# Patient Record
Sex: MALE | Race: WHITE | Employment: OTHER | URBAN - METROPOLITAN AREA
[De-identification: names, ages, dates, MRNs, and addresses within clinical notes are randomized per-mention and may not be internally consistent; named-entity substitution may affect disease eponyms.]

---

## 2019-06-19 ENCOUNTER — OFFICE VISIT (OUTPATIENT)
Dept: INTERNAL MEDICINE CLINIC | Age: 54
End: 2019-06-19
Payer: MEDICARE

## 2019-06-19 VITALS
WEIGHT: 205.6 LBS | BODY MASS INDEX: 30.45 KG/M2 | HEART RATE: 72 BPM | HEIGHT: 69 IN | SYSTOLIC BLOOD PRESSURE: 126 MMHG | DIASTOLIC BLOOD PRESSURE: 88 MMHG

## 2019-06-19 DIAGNOSIS — L40.9 PSORIASIS: ICD-10-CM

## 2019-06-19 DIAGNOSIS — I73.9 PVD (PERIPHERAL VASCULAR DISEASE) (HCC): ICD-10-CM

## 2019-06-19 DIAGNOSIS — F17.219 CIGARETTE NICOTINE DEPENDENCE WITH NICOTINE-INDUCED DISORDER: ICD-10-CM

## 2019-06-19 DIAGNOSIS — E11.9 TYPE 2 DIABETES MELLITUS WITHOUT COMPLICATION, WITHOUT LONG-TERM CURRENT USE OF INSULIN (HCC): Primary | ICD-10-CM

## 2019-06-19 DIAGNOSIS — E11.9 TYPE 2 DIABETES MELLITUS WITHOUT COMPLICATION, WITHOUT LONG-TERM CURRENT USE OF INSULIN (HCC): ICD-10-CM

## 2019-06-19 DIAGNOSIS — Z12.5 SCREENING FOR PROSTATE CANCER: ICD-10-CM

## 2019-06-19 DIAGNOSIS — L40.50 ARTHRITIS WITH PSORIASIS (HCC): ICD-10-CM

## 2019-06-19 LAB
A/G RATIO: 1.4 (ref 1.1–2.2)
ALBUMIN SERPL-MCNC: 3.8 G/DL (ref 3.4–5)
ALP BLD-CCNC: 115 U/L (ref 40–129)
ALT SERPL-CCNC: 19 U/L (ref 10–40)
ANION GAP SERPL CALCULATED.3IONS-SCNC: 13 MMOL/L (ref 3–16)
AST SERPL-CCNC: 26 U/L (ref 15–37)
BILIRUB SERPL-MCNC: <0.2 MG/DL (ref 0–1)
BILIRUBIN URINE: NEGATIVE
BLOOD, URINE: NEGATIVE
BUN BLDV-MCNC: 5 MG/DL (ref 7–20)
CALCIUM SERPL-MCNC: 8.9 MG/DL (ref 8.3–10.6)
CHLORIDE BLD-SCNC: 103 MMOL/L (ref 99–110)
CHOLESTEROL, FASTING: 198 MG/DL (ref 0–199)
CLARITY: CLEAR
CO2: 21 MMOL/L (ref 21–32)
COLOR: YELLOW
CREAT SERPL-MCNC: 0.7 MG/DL (ref 0.9–1.3)
CREATININE URINE: 89.7 MG/DL (ref 39–259)
GFR AFRICAN AMERICAN: >60
GFR NON-AFRICAN AMERICAN: >60
GLOBULIN: 2.7 G/DL
GLUCOSE BLD-MCNC: 135 MG/DL (ref 70–99)
GLUCOSE URINE: 100 MG/DL
HCT VFR BLD CALC: 44.5 % (ref 40.5–52.5)
HDLC SERPL-MCNC: 41 MG/DL (ref 40–60)
HEMOGLOBIN: 15.2 G/DL (ref 13.5–17.5)
KETONES, URINE: NEGATIVE MG/DL
LDL CHOLESTEROL CALCULATED: ABNORMAL MG/DL
LDL CHOLESTEROL DIRECT: 101 MG/DL
LEUKOCYTE ESTERASE, URINE: NEGATIVE
MCH RBC QN AUTO: 34.5 PG (ref 26–34)
MCHC RBC AUTO-ENTMCNC: 34.2 G/DL (ref 31–36)
MCV RBC AUTO: 101 FL (ref 80–100)
MICROALBUMIN UR-MCNC: <1.2 MG/DL
MICROALBUMIN/CREAT UR-RTO: NORMAL MG/G (ref 0–30)
MICROSCOPIC EXAMINATION: ABNORMAL
NITRITE, URINE: NEGATIVE
PDW BLD-RTO: 13.5 % (ref 12.4–15.4)
PH UA: 6 (ref 5–8)
PLATELET # BLD: 236 K/UL (ref 135–450)
PMV BLD AUTO: 8.2 FL (ref 5–10.5)
POTASSIUM SERPL-SCNC: 4.2 MMOL/L (ref 3.5–5.1)
PROSTATE SPECIFIC ANTIGEN: 2.38 NG/ML (ref 0–4)
PROTEIN UA: NEGATIVE MG/DL
RBC # BLD: 4.41 M/UL (ref 4.2–5.9)
SODIUM BLD-SCNC: 137 MMOL/L (ref 136–145)
SPECIFIC GRAVITY UA: 1.02 (ref 1–1.03)
TOTAL PROTEIN: 6.5 G/DL (ref 6.4–8.2)
TRIGLYCERIDE, FASTING: 372 MG/DL (ref 0–150)
TSH REFLEX: 1.16 UIU/ML (ref 0.27–4.2)
URINE TYPE: ABNORMAL
UROBILINOGEN, URINE: 0.2 E.U./DL
VLDLC SERPL CALC-MCNC: ABNORMAL MG/DL
WBC # BLD: 7 K/UL (ref 4–11)

## 2019-06-19 PROCEDURE — 3017F COLORECTAL CA SCREEN DOC REV: CPT | Performed by: INTERNAL MEDICINE

## 2019-06-19 PROCEDURE — 99204 OFFICE O/P NEW MOD 45 MIN: CPT | Performed by: INTERNAL MEDICINE

## 2019-06-19 PROCEDURE — G8417 CALC BMI ABV UP PARAM F/U: HCPCS | Performed by: INTERNAL MEDICINE

## 2019-06-19 PROCEDURE — G8427 DOCREV CUR MEDS BY ELIG CLIN: HCPCS | Performed by: INTERNAL MEDICINE

## 2019-06-19 PROCEDURE — 4004F PT TOBACCO SCREEN RCVD TLK: CPT | Performed by: INTERNAL MEDICINE

## 2019-06-19 PROCEDURE — 3046F HEMOGLOBIN A1C LEVEL >9.0%: CPT | Performed by: INTERNAL MEDICINE

## 2019-06-19 PROCEDURE — 2022F DILAT RTA XM EVC RTNOPTHY: CPT | Performed by: INTERNAL MEDICINE

## 2019-06-19 SDOH — HEALTH STABILITY: MENTAL HEALTH: HOW MANY STANDARD DRINKS CONTAINING ALCOHOL DO YOU HAVE ON A TYPICAL DAY?: 5 OR 6

## 2019-06-19 SDOH — HEALTH STABILITY: MENTAL HEALTH: HOW OFTEN DO YOU HAVE A DRINK CONTAINING ALCOHOL?: 4 OR MORE TIMES A WEEK

## 2019-06-19 ASSESSMENT — ENCOUNTER SYMPTOMS
WHEEZING: 0
RHINORRHEA: 0
NAUSEA: 0
BACK PAIN: 1
VOICE CHANGE: 0
EYE PAIN: 0
ABDOMINAL PAIN: 0
SHORTNESS OF BREATH: 0
EYE DISCHARGE: 0
VOMITING: 0
TROUBLE SWALLOWING: 0
COLOR CHANGE: 0
COUGH: 0

## 2019-06-19 ASSESSMENT — PATIENT HEALTH QUESTIONNAIRE - PHQ9
2. FEELING DOWN, DEPRESSED OR HOPELESS: 1
4. FEELING TIRED OR HAVING LITTLE ENERGY: 1
SUM OF ALL RESPONSES TO PHQ QUESTIONS 1-9: 3
8. MOVING OR SPEAKING SO SLOWLY THAT OTHER PEOPLE COULD HAVE NOTICED. OR THE OPPOSITE, BEING SO FIGETY OR RESTLESS THAT YOU HAVE BEEN MOVING AROUND A LOT MORE THAN USUAL: 0
SUM OF ALL RESPONSES TO PHQ QUESTIONS 1-9: 3
SUM OF ALL RESPONSES TO PHQ9 QUESTIONS 1 & 2: 1
10. IF YOU CHECKED OFF ANY PROBLEMS, HOW DIFFICULT HAVE THESE PROBLEMS MADE IT FOR YOU TO DO YOUR WORK, TAKE CARE OF THINGS AT HOME, OR GET ALONG WITH OTHER PEOPLE: 0
5. POOR APPETITE OR OVEREATING: 0
7. TROUBLE CONCENTRATING ON THINGS, SUCH AS READING THE NEWSPAPER OR WATCHING TELEVISION: 0
1. LITTLE INTEREST OR PLEASURE IN DOING THINGS: 0
9. THOUGHTS THAT YOU WOULD BE BETTER OFF DEAD, OR OF HURTING YOURSELF: 0
6. FEELING BAD ABOUT YOURSELF - OR THAT YOU ARE A FAILURE OR HAVE LET YOURSELF OR YOUR FAMILY DOWN: 1
3. TROUBLE FALLING OR STAYING ASLEEP: 0

## 2019-06-19 NOTE — PROGRESS NOTES
[place]olo [place]  1965  male  48 y.o. SUBJECTIVE:       Chief Complaint   Patient presents with   Dom Echeverria Doctor    Referral - General     rheumatology    Diabetes     Wants to take Metformin again       HPI:  This is a new patient here today to establish care. Patient reports he is disabled because he was told to have some kind of abnormal pathology that proned him to get recurrent fracture of his ribs and back . He moved from [place] to here. Currently staying in hotel. He is requesting referral to see a rheumatologist because of recurrence extensive psoriasis. In the past he used to take Humira. He does complains of multiple joint pain at times both ankles as well as lower back sometimes a small joints of both hands. He was diagnosed with diabetes about 7 years ago. His sugar went as high as 600. He was initially on insulin and later on switched to metformin. He does have some polyuria. However he denies polydipsia, significant weight loss, fatigue, tiredness. He is not motivated to quit smoking at this time.     PHQ-9 Total Score: 3 (6/19/2019 10:19 AM)      Past Medical History:   Diagnosis Date    Depression     Diabetes ([address]     Fracture, rib 2015    Fracture, vertebral, lumbar closed ([facility] 2018    Psoriatic arthritis ([facility]      Past Surgical History:   Procedure Laterality Date    JOINT REPLACEMENT Bilateral     Hips     Social History     Socioeconomic History    Marital status: Single     Spouse name: None    Number of children: None    Years of education: None    Highest education level: None   Occupational History    None   Social Needs    Financial resource strain: None    Food insecurity:     Worry: None     Inability: None    Transportation needs:     Medical: None     Non-medical: None   Tobacco Use    Smoking status: Current Every Day Smoker     Packs/day: 1.00     Years: 24.00     Pack years: 24.00     Types: Cigarettes    Smokeless tobacco: Never Used   Substance and Sexual Activity    Alcohol use: Yes     Frequency: 4 or more times a week     Drinks per session: 5 or 6     Binge frequency: Weekly    Drug use: Never    Sexual activity: Not Currently   Lifestyle    Physical activity:     Days per week: None     Minutes per session: None    Stress: None   Relationships    Social connections:     Talks on phone: None     Gets together: None     Attends Catholic service: None     Active member of club or organization: None     Attends meetings of clubs or organizations: None     Relationship status: None    Intimate partner violence:     Fear of current or ex partner: None     Emotionally abused: None     Physically abused: None     Forced sexual activity: None   Other Topics Concern    None   Social History Narrative    None     Family History   Problem Relation Age of Onset    Diabetes Mother     Alcohol Abuse Father     Stroke Father        Review of Systems   Constitutional: Negative for activity change, appetite change, chills, diaphoresis, fever and unexpected weight change. HENT: Negative for congestion, ear discharge, ear pain, rhinorrhea, trouble swallowing and voice change. Eyes: Negative for pain and discharge. Respiratory: Negative for cough, shortness of breath and wheezing. Cardiovascular: Negative for chest pain, palpitations and leg swelling. Gastrointestinal: Negative for abdominal pain, nausea and vomiting. Endocrine: Positive for polyuria. Negative for cold intolerance, heat intolerance, polydipsia and polyphagia. Genitourinary: Negative for dysuria, flank pain and hematuria. Musculoskeletal: Positive for arthralgias and back pain. Negative for joint swelling, neck pain and neck stiffness. Skin: Negative for color change and rash. Neurological: Negative for dizziness, syncope, light-headedness and headaches. Hematological: Negative for adenopathy. Does not bruise/bleed easily.    Psychiatric/Behavioral: COMPREHENSIVE METABOLIC PANEL; Future  -     Lipid, Fasting; Future  -     TSH with Reflex; Future  -     Urinalysis; Future  -     MICROALBUMIN / CREATININE URINE RATIO; Future  -     Diabetic Foot Exam  The patient is asked to make an attempt to improve diet and exercise patterns to aid in medical management of this problem. Psoriasis  -     [facility]    Screening for prostate cancer  -     Psa screening; Future    Arthritis with psoriasis ([facility]  -     [facility]    PVD (peripheral vascular disease) ([facility]  -     VL LOWER EXTREMITY ARTERIAL SEGMENTAL PRESSURES W PPG BILATERAL; Future  Patient is again advised to quit smoking.   He is not ready yet, he will let me know whenever he is ready  Cigarette nicotine dependence with nicotine-induced disorder  Not motivated to quit smoking            Orders Placed This Encounter   Procedures    VL LOWER EXTREMITY ARTERIAL SEGMENTAL PRESSURES W PPG BILATERAL     Standing Status:   Future     Standing Expiration Date:   6/19/2020    CBC     Standing Status:   Future     Number of Occurrences:   1     Standing Expiration Date:   6/19/2020    COMPREHENSIVE METABOLIC PANEL     Standing Status:   Future     Number of Occurrences:   1     Standing Expiration Date:   6/19/2020    Lipid, Fasting     Standing Status:   Future     Number of Occurrences:   1     Standing Expiration Date:   6/19/2020    TSH with Reflex     Standing Status:   Future     Number of Occurrences:   1     Standing Expiration Date:   6/19/2020    Urinalysis     Standing Status:   Future     Number of Occurrences:   1     Standing Expiration Date:   6/18/2020    MICROALBUMIN / CREATININE URINE RATIO     Standing Status:   Future     Number of Occurrences:   1     Standing Expiration Date:   6/19/2020    Psa screening     Standing Status:   Future     Number of Occurrences:   1     Standing Expiration Date: 6/19/2020   Sylvia Michele MD, Rhematology, [facility]     Referral Priority:   Routine     Referral Type:   Eval and Treat     Referral Reason:   Specialty Services Required     Referred to Provider:   Samantha Hampton MD     Requested Specialty:   Rheumatology     Number of Visits Requested:   1    Diabetic Foot Exam     No current outpatient medications on file. No current facility-administered medications for this visit. Return in about 1 week (around 6/26/2019). An After Visit Summary was printed and given to the patient. Documentation was done using voice recognition dragon software. Every effort was made to ensure accuracy; however, inadvertent  Unintentional computerized transcription errors may be present.

## 2019-06-20 DIAGNOSIS — E11.9 TYPE 2 DIABETES MELLITUS WITHOUT COMPLICATION, WITHOUT LONG-TERM CURRENT USE OF INSULIN (HCC): Primary | ICD-10-CM

## 2019-06-20 DIAGNOSIS — E78.5 DYSLIPIDEMIA: ICD-10-CM

## 2019-06-20 DIAGNOSIS — E11.9 TYPE 2 DIABETES MELLITUS WITHOUT COMPLICATION, WITHOUT LONG-TERM CURRENT USE OF INSULIN (HCC): ICD-10-CM

## 2019-06-20 LAB
ESTIMATED AVERAGE GLUCOSE: 134.1 MG/DL
HBA1C MFR BLD: 6.3 %

## 2019-06-20 RX ORDER — ATORVASTATIN CALCIUM 10 MG/1
10 TABLET, FILM COATED ORAL DAILY
Qty: 30 TABLET | Refills: 2 | Status: SHIPPED | OUTPATIENT
Start: 2019-06-20 | End: 2019-11-19 | Stop reason: SDUPTHER

## 2019-06-24 ENCOUNTER — HOSPITAL ENCOUNTER (OUTPATIENT)
Dept: VASCULAR LAB | Age: 54
Discharge: HOME | End: 2019-06-24
Payer: MEDICARE

## 2019-06-24 DIAGNOSIS — I73.9 PVD (PERIPHERAL VASCULAR DISEASE) (HCC): Primary | ICD-10-CM

## 2019-06-24 DIAGNOSIS — I73.9 PVD (PERIPHERAL VASCULAR DISEASE) (HCC): ICD-10-CM

## 2019-06-24 PROCEDURE — 93923 UPR/LXTR ART STDY 3+ LVLS: CPT

## 2019-06-27 ENCOUNTER — OFFICE VISIT (OUTPATIENT)
Dept: INTERNAL MEDICINE CLINIC | Age: 54
End: 2019-06-27
Payer: MEDICARE

## 2019-06-27 ENCOUNTER — TELEPHONE (OUTPATIENT)
Dept: FAMILY MEDICINE CLINIC | Age: 54
End: 2019-06-27

## 2019-06-27 VITALS
BODY MASS INDEX: 30.21 KG/M2 | DIASTOLIC BLOOD PRESSURE: 100 MMHG | WEIGHT: 204 LBS | SYSTOLIC BLOOD PRESSURE: 142 MMHG | HEIGHT: 69 IN | HEART RATE: 66 BPM

## 2019-06-27 DIAGNOSIS — F17.219 CIGARETTE NICOTINE DEPENDENCE WITH NICOTINE-INDUCED DISORDER: ICD-10-CM

## 2019-06-27 DIAGNOSIS — I10 ESSENTIAL HYPERTENSION: ICD-10-CM

## 2019-06-27 DIAGNOSIS — I73.9 PVD (PERIPHERAL VASCULAR DISEASE) (HCC): ICD-10-CM

## 2019-06-27 DIAGNOSIS — E11.42 DIABETIC POLYNEUROPATHY ASSOCIATED WITH TYPE 2 DIABETES MELLITUS (HCC): Primary | ICD-10-CM

## 2019-06-27 DIAGNOSIS — Z12.11 COLON CANCER SCREENING: ICD-10-CM

## 2019-06-27 PROCEDURE — 99214 OFFICE O/P EST MOD 30 MIN: CPT | Performed by: INTERNAL MEDICINE

## 2019-06-27 PROCEDURE — 4004F PT TOBACCO SCREEN RCVD TLK: CPT | Performed by: INTERNAL MEDICINE

## 2019-06-27 PROCEDURE — G8427 DOCREV CUR MEDS BY ELIG CLIN: HCPCS | Performed by: INTERNAL MEDICINE

## 2019-06-27 PROCEDURE — G8417 CALC BMI ABV UP PARAM F/U: HCPCS | Performed by: INTERNAL MEDICINE

## 2019-06-27 PROCEDURE — 2022F DILAT RTA XM EVC RTNOPTHY: CPT | Performed by: INTERNAL MEDICINE

## 2019-06-27 PROCEDURE — 3044F HG A1C LEVEL LT 7.0%: CPT | Performed by: INTERNAL MEDICINE

## 2019-06-27 PROCEDURE — 3017F COLORECTAL CA SCREEN DOC REV: CPT | Performed by: INTERNAL MEDICINE

## 2019-06-27 RX ORDER — BUPROPION HYDROCHLORIDE 150 MG/1
TABLET, EXTENDED RELEASE ORAL
Qty: 180 TABLET | Refills: 0 | Status: SHIPPED | OUTPATIENT
Start: 2019-06-27 | End: 2020-01-16

## 2019-06-27 RX ORDER — GABAPENTIN 100 MG/1
100 CAPSULE ORAL NIGHTLY
Qty: 30 CAPSULE | Refills: 2 | Status: SHIPPED | OUTPATIENT
Start: 2019-06-27 | End: 2019-07-30

## 2019-06-27 RX ORDER — ASPIRIN 81 MG/1
81 TABLET ORAL DAILY
Qty: 90 TABLET | Refills: 1 | Status: SHIPPED | OUTPATIENT
Start: 2019-06-27

## 2019-06-27 RX ORDER — LOSARTAN POTASSIUM 50 MG/1
50 TABLET ORAL DAILY
Qty: 90 TABLET | Refills: 0 | Status: SHIPPED | OUTPATIENT
Start: 2019-06-27 | End: 2019-11-19 | Stop reason: SDUPTHER

## 2019-06-27 ASSESSMENT — ENCOUNTER SYMPTOMS
EYE REDNESS: 0
SHORTNESS OF BREATH: 0
WHEEZING: 0
NAUSEA: 0
VOMITING: 0
ABDOMINAL PAIN: 0

## 2019-06-27 NOTE — PATIENT INSTRUCTIONS
Patient Education        Learning About Benefits From Quitting Smoking  How does quitting smoking make you healthier? If you're thinking about quitting smoking, you may have a few reasons to be smoke-free. Your health may be one of them. · When you quit smoking, you lower your risks for cancer, lung disease, heart attack, stroke, blood vessel disease, and blindness from macular degeneration. · When you're smoke-free, you get sick less often, and you heal faster. You are less likely to get colds, flu, bronchitis, and pneumonia. · As a nonsmoker, you may find that your mood is better and you are less stressed. When and how will you feel healthier? Quitting has real health benefits that start from day 1 of being smoke-free. And the longer you stay smoke-free, the healthier you get and the better you feel. The first hours  · After just 20 minutes, your blood pressure and heart rate go down. That means there's less stress on your heart and blood vessels. · Within 12 hours, the level of carbon monoxide in your blood drops back to normal. That makes room for more oxygen. With more oxygen in your body, you may notice that you have more energy than when you smoked. After 2 weeks  · Your lungs start to work better. · Your risk of heart attack starts to drop. After 1 month  · When your lungs are clear, you cough less and breathe deeper, so it's easier to be active. · Your sense of taste and smell return. That means you can enjoy food more than you have since you started smoking. Over the years  · After 1 year, your risk of heart disease is half what it would be if you kept smoking. · After 5 years, your risk of stroke starts to shrink. Within a few years after that, it's about the same as if you'd never smoked. · After 10 years, your risk of dying from lung cancer is cut by about half. And your risk for many other types of cancer is lower too. How would quitting help others in your life?   When you quit smoking, you improve the health of everyone who now breathes in your smoke. · Their heart, lung, and cancer risks drop, much like yours. · They are sick less. For babies and small children, living smoke-free means they're less likely to have ear infections, pneumonia, and bronchitis. · If you're a woman who is or will be pregnant someday, quitting smoking means a healthier . · Children who are close to you are less likely to become adult smokers. Where can you learn more? Go to https://Traypepiceweb.[email or web address] org and sign in to your Viver[employer] account. Enter [phone] in the Eloquii box to learn more about \"Learning About Benefits From Quitting Smoking. \"     If you do not have an account, please click on the \"Sign Up Now\" link. Current as of: 2018  Content Version: 12.0  © 2972-9046 [employer]. Care instructions adapted under license by [address] If you have questions about a medical condition or this instruction, always ask your healthcare professional. [address] any warranty or liability for your use of this information. Patient Education         COPD: Try a New Quit-Smoking Strategy (02:05)  Your health professional recommends that you watch this short online health video. Hear how one man with COPD finally quit after many years of smoking. How to watch the video    Scan the QR code   OR Visit the website    https://hwi. se/r/Jfqjilyu6l0os   Current as of: 2018  Content Version: 12.0   [employer]. Care instructions adapted under license by [address] If you have questions about a medical condition or this instruction, always ask your healthcare professional. [address] any warranty or liability for your use of this information.

## 2019-06-27 NOTE — TELEPHONE ENCOUNTER
Submitted PA for Gabapentin 100MG capsules, Key: [id number]. Medication has been APPROVED from 05/28/2019 through 06/26/2020. Please advise patient. Thank you.

## 2019-06-27 NOTE — PROGRESS NOTES
Shelby Sarahrashmi  1965  male  48 y.o. SUBJECTIVE:       Chief Complaint   Patient presents with    Diabetes     1 week f/u, not checking blood sugars. states wa on metformin 1000 mg bid in past.   aware needing eye exam--given #.     Leg Swelling     off and on bilateral leg--given referral for Dr. Dustin Cabrera Other     given FIT       HPI:  Follow-up visit. Patient started metformin on Lipitor. She has been complaining of pain since needles affecting both legs. Her recent Doppler study shows moderate arterial insufficiency to the left leg. He continues to suffer from multiple joint pain and swelling. Not up-to-date on diabetic eye exam.  He tried nicotine patches in the past but unsuccessful in quitting smoking.       Past Medical History:   Diagnosis Date    Depression     [facility] ([facility]     Fracture, rib 2015    Fracture, vertebral, lumbar closed ([facility] 2018    Psoriatic arthritis ([facility]      Past Surgical History:   Procedure Laterality Date    JOINT REPLACEMENT Bilateral     Hips     Social History     Socioeconomic History    Marital status: Single     Spouse name: None    Number of children: None    Years of education: None    Highest education level: None   Occupational History    None   Social Needs    Financial resource strain: None    Food insecurity:     Worry: None     Inability: None    Transportation needs:     Medical: None     Non-medical: None   Tobacco Use    Smoking status: Current Every Day Smoker     Packs/day: 1.00     Years: 24.00     Pack years: 24.00     Types: Cigarettes     Start date: 1/1/1984    Smokeless tobacco: Never Used   Substance and Sexual Activity    Alcohol use: Yes     Frequency: 4 or more times a week     Drinks per session: 5 or 6     Binge frequency: Weekly    Drug use: Never    Sexual activity: Not Currently   Lifestyle    Physical activity:     Days per week: None     Minutes per session: None    Stress: None   Relationships    Social connections:     Talks on phone: None     Gets together: None     Attends Congregational service: None     Active member of club or organization: None     Attends meetings of clubs or organizations: None     Relationship status: None    Intimate partner violence:     Fear of current or ex partner: None     Emotionally abused: None     Physically abused: None     Forced sexual activity: None   Other Topics Concern    None   Social History Narrative    None     Family History   Problem Relation Age of Onset    Diabetes Mother     Alcohol Abuse Father     Stroke Father        Review of Systems   Constitutional: Negative for diaphoresis and unexpected weight change. Eyes: Negative for redness and visual disturbance. Respiratory: Negative for shortness of breath and wheezing. Cardiovascular: Negative for chest pain and palpitations. Gastrointestinal: Negative for abdominal pain, nausea and vomiting. Endocrine: Negative for polydipsia and polyuria. Genitourinary: Negative for difficulty urinating, enuresis, flank pain and frequency. Musculoskeletal: Positive for arthralgias and joint swelling. Neurological: Negative for dizziness and light-headedness. Psychiatric/Behavioral: Negative for decreased concentration and dysphoric mood. OBJECTIVE:  Pulse Readings from Last 4 Encounters:   06/27/19 66   06/19/19 72     Wt Readings from Last 4 Encounters:   06/27/19 204 lb (92.5 kg)   06/19/19 205 lb 9.6 oz (93.3 kg)     BP Readings from Last 4 Encounters:   06/27/19 (!) 142/100   06/19/19 126/88     Physical Exam   Constitutional: He is oriented to person, place, and time. He appears well-developed. No distress. Neck: Normal carotid pulses present. Carotid bruit is not present. No thyromegaly present. Cardiovascular: Normal rate, regular rhythm and normal heart sounds. No murmur heard.   Pulmonary/Chest: Effort normal and breath sounds normal.   Musculoskeletal:   Mild  leg swelling affecting both ([address]  -     losartan (COZAAR) 50 MG tablet; Take 1 tablet by mouth daily  -     gabapentin (NEURONTIN) 100 MG capsule; Take 1 capsule by mouth nightly for 90 days. Essential hypertension  -     losartan (COZAAR) 50 MG tablet; Take 1 tablet by mouth daily    Cigarette nicotine dependence with nicotine-induced disorder  -     buPROPion (WELLBUTRIN SR) 150 MG extended release tablet; 1 tab in am for a week then 1 tab bid    Type 2 diabetes mellitus with diabetic peripheral angiopathy without gangrene [facility])  -     Ambulatory referral to Ophthalmology  The patient is asked to make an attempt to improve diet and exercise patterns to aid in medical management of this problem. Colon cancer screening  -     POCT Fecal Immunochemical Test (FIT); Future    PVD (peripheral vascular disease) (HCC)  -     aspirin EC 81 MG EC tablet; Take 1 tablet by mouth daily  Counseling about quitting smoking. He is going to follow-up with Dr. Ceferino Mata. Orders Placed This Encounter   Procedures    Ambulatory referral to Ophthalmology     Referral Priority:   Routine     Referral Type:   Consult for Advice and Opinion     Referral Reason:   Specialty Services Required     Referred to Provider:   Flaquito Sepulveda MD     Requested Specialty:   Ophthalmology     Number of Visits Requested:   1    POCT Fecal Immunochemical Test (FIT)     Standing Status:   Future     Standing Expiration Date:   6/27/2020     Current Outpatient Medications   Medication Sig Dispense Refill    buPROPion (WELLBUTRIN SR) 150 MG extended release tablet 1 tab in am for a week then 1 tab bid 180 tablet 0    aspirin EC 81 MG EC tablet Take 1 tablet by mouth daily 90 tablet 1    losartan (COZAAR) 50 MG tablet Take 1 tablet by mouth daily 90 tablet 0    gabapentin (NEURONTIN) 100 MG capsule Take 1 capsule by mouth nightly for 90 days.  30 capsule 2    metFORMIN (GLUCOPHAGE) 500 MG tablet Take 1 tablet by mouth daily (with breakfast) 30 tablet 5    atorvastatin (LIPITOR) 10 MG tablet Take 1 tablet by mouth daily 30 tablet 2     No current facility-administered medications for this visit. Return in about 3 months (around 9/27/2019). An After Visit Summary was printed and given to the patient. Documentation was done using voice recognition dragon software. Every effort was made to ensure accuracy; however, inadvertent  Unintentional computerized transcription errors may be present.

## 2019-07-15 ENCOUNTER — OFFICE VISIT (OUTPATIENT)
Dept: RHEUMATOLOGY | Age: 54
End: 2019-07-15
Payer: MEDICARE

## 2019-07-15 VITALS
HEIGHT: 69 IN | SYSTOLIC BLOOD PRESSURE: 184 MMHG | HEART RATE: 78 BPM | WEIGHT: 209 LBS | BODY MASS INDEX: 30.96 KG/M2 | DIASTOLIC BLOOD PRESSURE: 101 MMHG

## 2019-07-15 DIAGNOSIS — L40.9 PSORIASIS: ICD-10-CM

## 2019-07-15 DIAGNOSIS — L40.50 PSORIATIC ARTHRITIS (HCC): Primary | ICD-10-CM

## 2019-07-15 DIAGNOSIS — Z11.59 ENCOUNTER FOR SCREENING FOR OTHER VIRAL DISEASES: ICD-10-CM

## 2019-07-15 DIAGNOSIS — Z79.899 HIGH RISK MEDICATION USE: ICD-10-CM

## 2019-07-15 DIAGNOSIS — L40.50 PSORIATIC ARTHRITIS (HCC): ICD-10-CM

## 2019-07-15 LAB
C-REACTIVE PROTEIN: 4.3 MG/L (ref 0–5.1)
RHEUMATOID FACTOR: <10 IU/ML
SEDIMENTATION RATE, ERYTHROCYTE: 11 MM/HR (ref 0–20)

## 2019-07-15 PROCEDURE — G8417 CALC BMI ABV UP PARAM F/U: HCPCS | Performed by: INTERNAL MEDICINE

## 2019-07-15 PROCEDURE — 3017F COLORECTAL CA SCREEN DOC REV: CPT | Performed by: INTERNAL MEDICINE

## 2019-07-15 PROCEDURE — G8427 DOCREV CUR MEDS BY ELIG CLIN: HCPCS | Performed by: INTERNAL MEDICINE

## 2019-07-15 PROCEDURE — 99205 OFFICE O/P NEW HI 60 MIN: CPT | Performed by: INTERNAL MEDICINE

## 2019-07-15 PROCEDURE — 4004F PT TOBACCO SCREEN RCVD TLK: CPT | Performed by: INTERNAL MEDICINE

## 2019-07-15 RX ORDER — PREDNISONE 1 MG/1
TABLET ORAL
Qty: 40 TABLET | Refills: 0 | Status: SHIPPED | OUTPATIENT
Start: 2019-07-15 | End: 2019-08-07 | Stop reason: ALTCHOICE

## 2019-07-15 NOTE — PROGRESS NOTES
psoriasis. He also has nail pitting and onycholysis. He denies dactylitis, enthesitis, uveitis. He has a chronic low back pain which improves with exercise without any significant improvement with rest.  He also has stiffness in the back. He takes ibuprofen and Tylenol as needed with some benefit. HPI  Review of Systems    REVIEW OF SYSTEMS: Positive for rash, fatigue, ringing in ears, excessive worries, anxiety, difficulty falling asleep  Constitutional: No unanticipated weight loss or fevers. Integumentary: No photosensitivity, malar rash, livedo reticularis, alopecia and Raynaud's symptoms, sclerodactyly, skin tightening  Eyes: negative for visual disturbance and persistent redness, discharge from eyes   ENT: - No loss of hearing, vertigo, or recurrent ear infections.  - No history of nasal/oral ulcers. - No history of dry eyes/dry mouth  Cardiovascular: No history of pericarditis, chest pain or murmur or palpitations  Respiratory: No shortness of breath, cough or history of interstitial lung disease. No history of pleurisy. No history of tuberculosis or atypical infections. Gastrointestinal: No history of heart burn, dysphagia or esophageal dysmotility. No change in bowel habits or any inflammatory bowel disease. Genitourinary: No history renal disease, miscarriages. Hematologic/Lymphatic: No abnormal bruising or bleeding, blood clots or swollen lymph nodes. Neurological: No history of headaches, seizure or focal weakness. No history of neuropathies, paresthesias or hyperesthesias, facial droop, diplopia  Psychiatric: No history of bipolar disease, depression  Endocrine: Denies any polyuria, polydipsia and osteoporosis  Allergic/Immunologic: No nasal congestion or hives. I have reviewed patients Past medical History, Social History and Family History as mentioned in her chart and this remains unchanged fromprevious.     Past Medical History:   Diagnosis Date    Depression     Diabetes ([facility] BP: (!) 184/101   Site: Left Lower Arm   Pulse: 78   Weight: 209 lb (94.8 kg)   Height: 5' 9\" (1.753 m)     Physical Exam  Constitutional:  Well developed, well nourished, no acute distress, non-toxic appearance   Musculoskeletal:    RIGHT  Swell  Tender  ROM  LEFT  Swell  Tender  ROM    DIP2  0  0   [place]  0  0  FULL    DIP3  0  0  FULL   0  0   [place]   DIP4  0  0  FULL   0  0  FULL    DIP5  0  0  FULL   0  0   [place]   PIP1  0  0  FULL   0  0  FULL    PIP2  0  0  FULL   0  0  FULL    PIP3  0  0  FULL   0  0  FULL    PIP4  0  0  FULL   0  0  FULL    PIP5  0  0  FULL   0  0  FULL    MCP1  0  0  FULL   0  0  FULL    MCP2  0  0  FULL   0  0  FULL    MCP3  + + FULL   + + FULL    MCP4  0  0  FULL   0  0  FULL    MCP5  0  0  FULL   0  0  FULL    Wrist  + + FULL   + + FULL    Elbow  0  0  FULL   0  0  FULL    Shouldr  0  0  FULL   0  0  FULL    Hip  0  0  FULL   0  0  FULL    Knee  0  0  FULL   0  0  FULL    Ankle  + +  bony change  ++ ++  bony change   MTP1  0  0  FULL   0  0  FULL    MTP2  0  0  FULL   0  0  FULL    MTP3  0  0  FULL   0  0  FULL    MTP4  0  0  FULL   0  0  FULL    MTP5  0  0  FULL   0  0  FULL    IP1  0  0  FULL   0  0  FULL    IP2  0  0  FULL   0  0  FULL    IP3  0  0  FULL   0  0  FULL    IP4  0  0  FULL   0  0  FULL    IP5  0  0  FULL   0 0 FULL     Onycholysis and nail pitting+  Ambulates without assistance, normal gait  Neck: Full ROM, no tenderness,supple   Back- b/l si joint tenderness  Eyes:  PERRL, extra ocular movements intact, conjunctiva normal   HEENT:  Atraumatic, normocephalic, external ears normal, oropharynx moist, no pharyngeal exudates.    Respiratory:  No respiratory distress  GI:  Soft, nondistended, normal bowel sounds, nontender, noorganomegaly, no mass, no rebound, no guarding   :  No costovertebral angle tenderness   Integument:  Well hydrated, telangiectasias, plaque psoriasis on extremities, trunk, scalp  Lymphatic:  No lymphadenopathy noted   Neurologic:   Alert & oriented x 3, CN 2-12 normal, no focal deficits noted. Sensations Intact. Muscle strength 5/5 proximallyand distally in upper and lower extremities. Psychiatric:  Speech and behavior appropriate           LABS AND IMAGING  Outside data reviewed and in HPI    Lab Results   Component Value Date    WBC 7.0 06/19/2019    RBC 4.41 06/19/2019    HGB 15.2 06/19/2019    HCT 44.5 06/19/2019     06/19/2019    .0 06/19/2019    MCH 34.5 06/19/2019    MCHC 34.2 06/19/2019    RDW 13.5 06/19/2019       Chemistry        Component Value Date/Time     06/19/2019 1052    K 4.2 06/19/2019 1052     06/19/2019 1052    CO2 21 06/19/2019 1052    BUN 5 (L) 06/19/2019 1052    CREATININE 0.7 (L) 06/19/2019 1052        Component Value Date/Time    CALCIUM 8.9 06/19/2019 1052    ALKPHOS 115 06/19/2019 1052    AST 26 06/19/2019 1052    ALT 19 06/19/2019 1052    BILITOT <0.2 06/19/2019 1052          No results found for: SEDRATE  No results found for: CRP  No results found for: OMA, WILFRID, SSA, SSB, C3, C4  No results found for: RF, CCPABIGG  No results found for: OMA, ANATITER, ANAINT, PATH  No results found for: DSDNAG, DSDNAIGGIFA  No results found for: SSAROAB, SSALAAB  No results found for: SMAB, RNPAB  No results found for: CENTABIGG  No results found for: C3, C4, ACE  No results found for: JO1, VITD25, [id number]  No results found for: Teri Grim  No results found for: [id number]  No results found for: TSHFT4, TSH  No results found for: VITD25  ASSESSMENT AND PLAN      Assessment/Plan:      ASSESSMENT:    1. Psoriatic arthritis ([address]    2. Psoriasis    3. High risk medication use    4. Encounter for screening for other viral diseases         PLAN:     Nidhi Jim was seen today for new patient.     Diagnoses and all orders for this visit:    Psoriatic arthritis (HCC)-joint symptoms most likely secondary to psoriatic arthritis  -Peripheral arthritis, nail pitting/onycholysis  -Active synovitis on joint exam  -I will

## 2019-07-16 LAB
ANTI-NUCLEAR ANTIBODY (ANA): NEGATIVE
CYCLIC CITRULLINATED PEPTIDE ANTIBODY IGG: <0.5 U/ML (ref 0–2.9)
HEPATITIS B CORE IGM ANTIBODY: NORMAL
HEPATITIS B SURFACE ANTIGEN INTERPRETATION: NORMAL
HEPATITIS C ANTIBODY INTERPRETATION: REACTIVE

## 2019-07-18 ENCOUNTER — TELEPHONE (OUTPATIENT)
Dept: RHEUMATOLOGY | Age: 54
End: 2019-07-18

## 2019-07-18 DIAGNOSIS — R76.8 HEPATITIS C ANTIBODY POSITIVE IN BLOOD: Primary | ICD-10-CM

## 2019-07-18 LAB
QUANTI TB GOLD PLUS: NEGATIVE
QUANTI TB1 MINUS NIL: 0.01 IU/ML (ref 0–0.34)
QUANTI TB2 MINUS NIL: 0.01 IU/ML (ref 0–0.34)
QUANTIFERON MITOGEN: 8.19 IU/ML
QUANTIFERON NIL: 0.02 IU/ML

## 2019-07-24 ENCOUNTER — TELEPHONE (OUTPATIENT)
Dept: RHEUMATOLOGY | Age: 54
End: 2019-07-24

## 2019-07-25 DIAGNOSIS — L40.50 PSORIATIC ARTHRITIS (HCC): ICD-10-CM

## 2019-07-30 ENCOUNTER — OFFICE VISIT (OUTPATIENT)
Dept: INTERNAL MEDICINE CLINIC | Age: 54
End: 2019-07-30
Payer: MEDICARE

## 2019-07-30 VITALS
HEIGHT: 69 IN | BODY MASS INDEX: 30.81 KG/M2 | HEART RATE: 66 BPM | SYSTOLIC BLOOD PRESSURE: 148 MMHG | WEIGHT: 208 LBS | DIASTOLIC BLOOD PRESSURE: 96 MMHG

## 2019-07-30 DIAGNOSIS — L03.115 CELLULITIS OF RIGHT LOWER EXTREMITY: ICD-10-CM

## 2019-07-30 DIAGNOSIS — R76.8 HEPATITIS C ANTIBODY POSITIVE IN BLOOD: ICD-10-CM

## 2019-07-30 DIAGNOSIS — L08.9 WOUND INFECTION: Primary | ICD-10-CM

## 2019-07-30 DIAGNOSIS — T14.8XXA WOUND INFECTION: ICD-10-CM

## 2019-07-30 DIAGNOSIS — Z23 NEED FOR TD VACCINE: ICD-10-CM

## 2019-07-30 DIAGNOSIS — E11.42 DIABETIC POLYNEUROPATHY ASSOCIATED WITH TYPE 2 DIABETES MELLITUS (HCC): ICD-10-CM

## 2019-07-30 DIAGNOSIS — L08.9 WOUND INFECTION: ICD-10-CM

## 2019-07-30 DIAGNOSIS — Z23 NEED FOR PROPHYLACTIC VACCINATION AGAINST DIPHTHERIA-TETANUS-PERTUSSIS (DTP): ICD-10-CM

## 2019-07-30 DIAGNOSIS — T14.8XXA WOUND INFECTION: Primary | ICD-10-CM

## 2019-07-30 LAB
ANION GAP SERPL CALCULATED.3IONS-SCNC: 14 MMOL/L (ref 3–16)
BUN BLDV-MCNC: 9 MG/DL (ref 7–20)
CALCIUM SERPL-MCNC: 9.5 MG/DL (ref 8.3–10.6)
CHLORIDE BLD-SCNC: 100 MMOL/L (ref 99–110)
CO2: 23 MMOL/L (ref 21–32)
CREAT SERPL-MCNC: 0.7 MG/DL (ref 0.9–1.3)
GFR AFRICAN AMERICAN: >60
GFR NON-AFRICAN AMERICAN: >60
GLUCOSE BLD-MCNC: 169 MG/DL (ref 70–99)
HCT VFR BLD CALC: 44.8 % (ref 40.5–52.5)
HEMOGLOBIN: 15.4 G/DL (ref 13.5–17.5)
MCH RBC QN AUTO: 34.7 PG (ref 26–34)
MCHC RBC AUTO-ENTMCNC: 34.3 G/DL (ref 31–36)
MCV RBC AUTO: 101.1 FL (ref 80–100)
PDW BLD-RTO: 12.7 % (ref 12.4–15.4)
PLATELET # BLD: 252 K/UL (ref 135–450)
PMV BLD AUTO: 8.2 FL (ref 5–10.5)
POTASSIUM SERPL-SCNC: 4.3 MMOL/L (ref 3.5–5.1)
RBC # BLD: 4.43 M/UL (ref 4.2–5.9)
SODIUM BLD-SCNC: 137 MMOL/L (ref 136–145)
WBC # BLD: 8.6 K/UL (ref 4–11)

## 2019-07-30 PROCEDURE — 3044F HG A1C LEVEL LT 7.0%: CPT | Performed by: INTERNAL MEDICINE

## 2019-07-30 PROCEDURE — 90471 IMMUNIZATION ADMIN: CPT | Performed by: INTERNAL MEDICINE

## 2019-07-30 PROCEDURE — 3017F COLORECTAL CA SCREEN DOC REV: CPT | Performed by: INTERNAL MEDICINE

## 2019-07-30 PROCEDURE — G8417 CALC BMI ABV UP PARAM F/U: HCPCS | Performed by: INTERNAL MEDICINE

## 2019-07-30 PROCEDURE — G8427 DOCREV CUR MEDS BY ELIG CLIN: HCPCS | Performed by: INTERNAL MEDICINE

## 2019-07-30 PROCEDURE — 99214 OFFICE O/P EST MOD 30 MIN: CPT | Performed by: INTERNAL MEDICINE

## 2019-07-30 PROCEDURE — 4004F PT TOBACCO SCREEN RCVD TLK: CPT | Performed by: INTERNAL MEDICINE

## 2019-07-30 PROCEDURE — 2022F DILAT RTA XM EVC RTNOPTHY: CPT | Performed by: INTERNAL MEDICINE

## 2019-07-30 PROCEDURE — 90715 TDAP VACCINE 7 YRS/> IM: CPT | Performed by: INTERNAL MEDICINE

## 2019-07-30 RX ORDER — GABAPENTIN 300 MG/1
300 CAPSULE ORAL NIGHTLY
Qty: 30 CAPSULE | Refills: 2 | Status: SHIPPED | OUTPATIENT
Start: 2019-07-30 | End: 2019-11-19 | Stop reason: SDUPTHER

## 2019-07-30 RX ORDER — SULFAMETHOXAZOLE AND TRIMETHOPRIM 800; 160 MG/1; MG/1
1 TABLET ORAL 2 TIMES DAILY
Qty: 20 TABLET | Refills: 0 | Status: SHIPPED | OUTPATIENT
Start: 2019-07-30 | End: 2019-08-09

## 2019-07-30 ASSESSMENT — ENCOUNTER SYMPTOMS
PHOTOPHOBIA: 0
ABDOMINAL PAIN: 0
DIARRHEA: 0
WHEEZING: 0
VOMITING: 0
SHORTNESS OF BREATH: 0

## 2019-07-30 NOTE — PROGRESS NOTES
week: None     Minutes per session: None    Stress: None   Relationships    Social connections:     Talks on phone: None     Gets together: None     Attends Scientologist service: None     Active member of club or organization: None     Attends meetings of clubs or organizations: None     Relationship status: None    Intimate partner violence:     Fear of current or ex partner: None     Emotionally abused: None     Physically abused: None     Forced sexual activity: None   Other Topics Concern    None   Social History Narrative    None     Family History   Problem Relation Age of Onset    Diabetes Mother     Alcohol Abuse Father     Stroke Father        Review of Systems   Constitutional: Negative for fatigue and unexpected weight change. Eyes: Negative for photophobia. Respiratory: Negative for shortness of breath and wheezing. Cardiovascular: Negative for chest pain and palpitations. Gastrointestinal: Negative for abdominal pain, diarrhea and vomiting. Genitourinary: Negative for difficulty urinating and frequency. Neurological: Negative for dizziness and light-headedness. OBJECTIVE:  Pulse Readings from Last 4 Encounters:   07/30/19 66   07/15/19 78   06/27/19 66   06/19/19 72     Wt Readings from Last 4 Encounters:   07/30/19 208 lb (94.3 kg)   07/15/19 209 lb (94.8 kg)   06/27/19 204 lb (92.5 kg)   06/19/19 205 lb 9.6 oz (93.3 kg)     BP Readings from Last 4 Encounters:   07/30/19 (!) 148/96   07/15/19 (!) 184/101   06/27/19 (!) 142/100   06/19/19 126/88     Physical Exam   Constitutional: He appears well-developed and well-nourished. No distress. Eyes: Conjunctivae are normal. No scleral icterus. Cardiovascular: Normal rate, regular rhythm and normal heart sounds. Pulmonary/Chest: Effort normal and breath sounds normal. No respiratory distress. Abdominal: Soft. Bowel sounds are normal.   Musculoskeletal: He exhibits no edema. Skin: There is erythema.         Nursing note and daily 90 tablet 1    losartan (COZAAR) 50 MG tablet Take 1 tablet by mouth daily 90 tablet 0    metFORMIN (GLUCOPHAGE) 500 MG tablet Take 1 tablet by mouth daily (with breakfast) 30 tablet 5    atorvastatin (LIPITOR) 10 MG tablet Take 1 tablet by mouth daily 30 tablet 2    Adalimumab (HUMIRA PEN) 40 MG/0.4ML PNKT Inject 40 mg into the skin every 14 days (Patient not taking: Reported on 7/30/2019) 2 each 5    predniSONE (DELTASONE) 5 MG tablet Take 4 tabs daily for 4 days, 3 tabs daily for 4 days, 2 tabs daily for 4 days, 1 tab daily for 4 days and stop (Patient not taking: Reported on 7/30/2019) 40 tablet 0    buPROPion (WELLBUTRIN SR) 150 MG extended release tablet 1 tab in am for a week then 1 tab bid (Patient not taking: Reported on 7/30/2019) 180 tablet 0     No current facility-administered medications for this visit. Return in about 10 days (around 8/9/2019) for HTN, wound. If worsening symptoms patient should come back for sooner appointment. An After Visit Summary was printed and given to the patient. Documentation was done using voice recognition dragon software. Every effort was made to ensure accuracy; however, inadvertent  Unintentional computerized transcription errors may be present.

## 2019-07-31 DIAGNOSIS — E11.42 TYPE 2 DIABETES MELLITUS WITH DIABETIC POLYNEUROPATHY, WITHOUT LONG-TERM CURRENT USE OF INSULIN (HCC): Primary | ICD-10-CM

## 2019-07-31 RX ORDER — LANCETS 30 GAUGE
1 EACH MISCELLANEOUS DAILY
Qty: 100 EACH | Refills: 3 | Status: SHIPPED | OUTPATIENT
Start: 2019-07-31

## 2019-07-31 RX ORDER — GLUCOSAMINE HCL/CHONDROITIN SU 500-400 MG
CAPSULE ORAL
Qty: 100 STRIP | Refills: 3 | Status: SHIPPED | OUTPATIENT
Start: 2019-07-31

## 2019-08-01 LAB
HCV QNT BY NAAT IU/ML: NOT DETECTED IU/ML
HCV QNT BY NAAT LOG IU/ML: NOT DETECTED LOG IU/ML
INTERPRETATION: NOT DETECTED

## 2019-08-02 LAB
GRAM STAIN RESULT: ABNORMAL
ORGANISM: ABNORMAL
ORGANISM: ABNORMAL
WOUND/ABSCESS: ABNORMAL

## 2019-08-07 ENCOUNTER — OFFICE VISIT (OUTPATIENT)
Dept: INTERNAL MEDICINE CLINIC | Age: 54
End: 2019-08-07
Payer: MEDICARE

## 2019-08-07 VITALS
BODY MASS INDEX: 29.62 KG/M2 | HEIGHT: 69 IN | SYSTOLIC BLOOD PRESSURE: 120 MMHG | WEIGHT: 200 LBS | DIASTOLIC BLOOD PRESSURE: 70 MMHG | HEART RATE: 84 BPM

## 2019-08-07 DIAGNOSIS — L08.9 WOUND INFECTION: ICD-10-CM

## 2019-08-07 DIAGNOSIS — L03.115 CELLULITIS OF RIGHT LOWER EXTREMITY: Primary | ICD-10-CM

## 2019-08-07 DIAGNOSIS — T14.8XXA WOUND INFECTION: ICD-10-CM

## 2019-08-07 PROCEDURE — G8417 CALC BMI ABV UP PARAM F/U: HCPCS | Performed by: INTERNAL MEDICINE

## 2019-08-07 PROCEDURE — 4004F PT TOBACCO SCREEN RCVD TLK: CPT | Performed by: INTERNAL MEDICINE

## 2019-08-07 PROCEDURE — G8427 DOCREV CUR MEDS BY ELIG CLIN: HCPCS | Performed by: INTERNAL MEDICINE

## 2019-08-07 PROCEDURE — 3017F COLORECTAL CA SCREEN DOC REV: CPT | Performed by: INTERNAL MEDICINE

## 2019-08-07 PROCEDURE — 99212 OFFICE O/P EST SF 10 MIN: CPT | Performed by: INTERNAL MEDICINE

## 2019-08-07 RX ORDER — GREEN TEA/HOODIA GORDONII 315-12.5MG
1 CAPSULE ORAL 2 TIMES DAILY
Qty: 60 TABLET | Refills: 0 | Status: SHIPPED | OUTPATIENT
Start: 2019-08-07 | End: 2019-09-06

## 2019-08-07 RX ORDER — SULFAMETHOXAZOLE AND TRIMETHOPRIM 800; 160 MG/1; MG/1
1 TABLET ORAL 2 TIMES DAILY
Qty: 8 TABLET | Refills: 0 | Status: SHIPPED | OUTPATIENT
Start: 2019-08-07 | End: 2019-08-11

## 2019-08-07 ASSESSMENT — ENCOUNTER SYMPTOMS
WHEEZING: 0
SHORTNESS OF BREATH: 0

## 2019-11-04 ENCOUNTER — TELEPHONE (OUTPATIENT)
Dept: INTERNAL MEDICINE CLINIC | Age: 54
End: 2019-11-04
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
## 2019-11-08 ENCOUNTER — TELEPHONE (OUTPATIENT)
Dept: PRIMARY CARE CLINIC | Age: 54
End: 2019-11-08

## 2019-11-08 ENCOUNTER — OFFICE VISIT (OUTPATIENT)
Dept: INTERNAL MEDICINE CLINIC | Age: 54
End: 2019-11-08
Payer: MEDICARE

## 2019-11-08 VITALS
BODY MASS INDEX: 29.03 KG/M2 | HEART RATE: 84 BPM | HEIGHT: 69 IN | DIASTOLIC BLOOD PRESSURE: 84 MMHG | SYSTOLIC BLOOD PRESSURE: 138 MMHG | WEIGHT: 196 LBS

## 2019-11-08 DIAGNOSIS — Z79.899 HIGH RISK MEDICATION USE: ICD-10-CM

## 2019-11-08 DIAGNOSIS — F32.A DEPRESSION, UNSPECIFIED DEPRESSION TYPE: ICD-10-CM

## 2019-11-08 DIAGNOSIS — S92.902D CLOSED FRACTURE OF LEFT FOOT WITH ROUTINE HEALING, SUBSEQUENT ENCOUNTER: ICD-10-CM

## 2019-11-08 DIAGNOSIS — G89.21 CHRONIC PAIN DUE TO TRAUMA: ICD-10-CM

## 2019-11-08 DIAGNOSIS — S52.92XD CLOSED FRACTURE OF LEFT FOREARM WITH ROUTINE HEALING, SUBSEQUENT ENCOUNTER: ICD-10-CM

## 2019-11-08 DIAGNOSIS — F43.9 STRESS: ICD-10-CM

## 2019-11-08 DIAGNOSIS — Z59.9 FINANCIAL DIFFICULTY: ICD-10-CM

## 2019-11-08 DIAGNOSIS — S22.42XG CLOSED FRACTURE OF MULTIPLE RIBS OF LEFT SIDE WITH DELAYED HEALING, SUBSEQUENT ENCOUNTER: Primary | ICD-10-CM

## 2019-11-08 PROBLEM — S92.902A CLOSED FRACTURE OF BONE OF LEFT FOOT: Status: ACTIVE | Noted: 2019-11-08

## 2019-11-08 PROBLEM — S92.322A FRACTURE OF SECOND METATARSAL BONE OF LEFT FOOT: Status: ACTIVE | Noted: 2019-09-16

## 2019-11-08 PROBLEM — S52.209B OPEN FRACTURE OF RADIUS AND ULNA: Status: ACTIVE | Noted: 2019-09-14

## 2019-11-08 PROBLEM — S52.90XB OPEN FRACTURE OF RADIUS AND ULNA: Status: ACTIVE | Noted: 2019-09-14

## 2019-11-08 PROBLEM — S52.92XA CLOSED FRACTURE OF LEFT FOREARM: Status: ACTIVE | Noted: 2019-11-08

## 2019-11-08 PROBLEM — S22.42XA MULTIPLE CLOSED FRACTURES OF RIBS OF LEFT SIDE: Status: ACTIVE | Noted: 2019-11-08

## 2019-11-08 PROCEDURE — 99214 OFFICE O/P EST MOD 30 MIN: CPT | Performed by: INTERNAL MEDICINE

## 2019-11-08 PROCEDURE — G8417 CALC BMI ABV UP PARAM F/U: HCPCS | Performed by: INTERNAL MEDICINE

## 2019-11-08 PROCEDURE — 3017F COLORECTAL CA SCREEN DOC REV: CPT | Performed by: INTERNAL MEDICINE

## 2019-11-08 PROCEDURE — G8427 DOCREV CUR MEDS BY ELIG CLIN: HCPCS | Performed by: INTERNAL MEDICINE

## 2019-11-08 PROCEDURE — G8484 FLU IMMUNIZE NO ADMIN: HCPCS | Performed by: INTERNAL MEDICINE

## 2019-11-08 PROCEDURE — 4004F PT TOBACCO SCREEN RCVD TLK: CPT | Performed by: INTERNAL MEDICINE

## 2019-11-08 RX ORDER — OXYCODONE HYDROCHLORIDE AND ACETAMINOPHEN 5; 325 MG/1; MG/1
1 TABLET ORAL EVERY 12 HOURS PRN
Qty: 12 TABLET | Refills: 0 | Status: SHIPPED | OUTPATIENT
Start: 2019-11-08 | End: 2019-11-14

## 2019-11-08 RX ORDER — ACETAMINOPHEN 500 MG
500 TABLET ORAL EVERY 6 HOURS PRN
COMMUNITY

## 2019-11-08 SDOH — ECONOMIC STABILITY - INCOME SECURITY: PROBLEM RELATED TO HOUSING AND ECONOMIC CIRCUMSTANCES, UNSPECIFIED: Z59.9

## 2019-11-08 ASSESSMENT — ENCOUNTER SYMPTOMS
VOICE CHANGE: 0
PHOTOPHOBIA: 0
TROUBLE SWALLOWING: 0
NAUSEA: 0
VOMITING: 0
SHORTNESS OF BREATH: 0
WHEEZING: 0
ABDOMINAL PAIN: 0

## 2019-11-11 ENCOUNTER — TELEPHONE (OUTPATIENT)
Dept: PRIMARY CARE CLINIC | Age: 54
End: 2019-11-11

## 2019-11-11 DIAGNOSIS — S92.902D CLOSED FRACTURE OF LEFT FOOT WITH ROUTINE HEALING, SUBSEQUENT ENCOUNTER: ICD-10-CM

## 2019-11-11 DIAGNOSIS — S22.42XG CLOSED FRACTURE OF MULTIPLE RIBS OF LEFT SIDE WITH DELAYED HEALING, SUBSEQUENT ENCOUNTER: Primary | ICD-10-CM

## 2019-11-11 DIAGNOSIS — S52.92XD CLOSED FRACTURE OF LEFT FOREARM WITH ROUTINE HEALING, SUBSEQUENT ENCOUNTER: ICD-10-CM

## 2019-11-11 DIAGNOSIS — F32.A DEPRESSION, UNSPECIFIED DEPRESSION TYPE: ICD-10-CM

## 2019-11-13 ENCOUNTER — TELEPHONE (OUTPATIENT)
Dept: PRIMARY CARE CLINIC | Age: 54
End: 2019-11-13

## 2019-11-13 ENCOUNTER — TELEPHONE (OUTPATIENT)
Dept: OTHER | Age: 54
End: 2019-11-13

## 2019-11-13 LAB
6-ACETYLMORPHINE: NOT DETECTED
7-AMINOCLONAZEPAM: NOT DETECTED
ALPHA-OH-ALPRAZOLAM: NOT DETECTED
ALPRAZOLAM: NOT DETECTED
AMPHETAMINE: NOT DETECTED
BARBITURATES: NOT DETECTED
BENZOYLECGONINE: NOT DETECTED
BUPRENORPHINE: NOT DETECTED
CARISOPRODOL: NOT DETECTED
CLONAZEPAM: NOT DETECTED
CODEINE: NOT DETECTED
CREATININE URINE: 171.4 MG/DL (ref 20–400)
DIAZEPAM: NOT DETECTED
DRUGS EXPECTED: NORMAL
EER PAIN MGT DRUG PANEL, HIGH RES/EMIT U: NORMAL
ETHYL GLUCURONIDE: NOT DETECTED
FENTANYL: NOT DETECTED
HYDROCODONE: NOT DETECTED
HYDROMORPHONE: NOT DETECTED
LORAZEPAM: NOT DETECTED
MARIJUANA METABOLITE: NOT DETECTED
MDA: NOT DETECTED
MDEA: NOT DETECTED
MDMA URINE: NOT DETECTED
MEPERIDINE: NOT DETECTED
METHADONE: NOT DETECTED
METHAMPHETAMINE: NOT DETECTED
METHYLPHENIDATE: NOT DETECTED
MIDAZOLAM: NOT DETECTED
MORPHINE: NOT DETECTED
NORBUPRENORPHINE, FREE: NOT DETECTED
NORDIAZEPAM: NOT DETECTED
NORFENTANYL: NOT DETECTED
NORHYDROCODONE, URINE: NOT DETECTED
NOROXYCODONE: NOT DETECTED
NOROXYMORPHONE, URINE: NOT DETECTED
OXAZEPAM: NOT DETECTED
OXYCODONE: NOT DETECTED
OXYMORPHONE: NOT DETECTED
PAIN MANAGEMENT DRUG PANEL: NORMAL
PAIN MANAGEMENT DRUG PANEL: NORMAL
PCP: NOT DETECTED
PHENTERMINE: NOT DETECTED
PROPOXYPHENE: NOT DETECTED
TAPENTADOL, URINE: NOT DETECTED
TAPENTADOL-O-SULFATE, URINE: NOT DETECTED
TEMAZEPAM: NOT DETECTED
TRAMADOL: NOT DETECTED
ZOLPIDEM: NOT DETECTED

## 2019-11-14 ENCOUNTER — TELEPHONE (OUTPATIENT)
Dept: INTERNAL MEDICINE CLINIC | Age: 54
End: 2019-11-14

## 2019-11-19 DIAGNOSIS — E78.5 DYSLIPIDEMIA: ICD-10-CM

## 2019-11-19 DIAGNOSIS — E11.9 TYPE 2 DIABETES MELLITUS WITHOUT COMPLICATION, WITHOUT LONG-TERM CURRENT USE OF INSULIN (HCC): ICD-10-CM

## 2019-11-19 DIAGNOSIS — E11.42 DIABETIC POLYNEUROPATHY ASSOCIATED WITH TYPE 2 DIABETES MELLITUS (HCC): ICD-10-CM

## 2019-11-19 DIAGNOSIS — I10 ESSENTIAL HYPERTENSION: ICD-10-CM

## 2019-11-19 RX ORDER — GABAPENTIN 300 MG/1
300 CAPSULE ORAL NIGHTLY
Qty: 90 CAPSULE | Refills: 0 | Status: SHIPPED | OUTPATIENT
Start: 2019-11-19 | End: 2019-12-02 | Stop reason: SDUPTHER

## 2019-11-19 RX ORDER — ATORVASTATIN CALCIUM 10 MG/1
10 TABLET, FILM COATED ORAL DAILY
Qty: 90 TABLET | Refills: 1 | Status: SHIPPED | OUTPATIENT
Start: 2019-11-19 | End: 2020-04-14 | Stop reason: SDUPTHER

## 2019-11-19 RX ORDER — LOSARTAN POTASSIUM 50 MG/1
50 TABLET ORAL DAILY
Qty: 90 TABLET | Refills: 1 | Status: SHIPPED | OUTPATIENT
Start: 2019-11-19 | End: 2020-04-14 | Stop reason: SDUPTHER

## 2019-11-21 ENCOUNTER — TELEPHONE (OUTPATIENT)
Dept: PAIN MANAGEMENT | Age: 54
End: 2019-11-21

## 2019-11-30 DIAGNOSIS — E11.42 DIABETIC POLYNEUROPATHY ASSOCIATED WITH TYPE 2 DIABETES MELLITUS (HCC): ICD-10-CM

## 2019-12-02 RX ORDER — GABAPENTIN 300 MG/1
CAPSULE ORAL
Qty: 30 CAPSULE | Refills: 1 | Status: SHIPPED | OUTPATIENT
Start: 2019-12-02 | End: 2019-12-06 | Stop reason: SDUPTHER

## 2019-12-06 ENCOUNTER — OFFICE VISIT (OUTPATIENT)
Dept: INTERNAL MEDICINE CLINIC | Age: 54
End: 2019-12-06
Payer: MEDICARE

## 2019-12-06 VITALS
WEIGHT: 196 LBS | HEIGHT: 69 IN | SYSTOLIC BLOOD PRESSURE: 120 MMHG | BODY MASS INDEX: 29.03 KG/M2 | DIASTOLIC BLOOD PRESSURE: 70 MMHG | HEART RATE: 96 BPM

## 2019-12-06 DIAGNOSIS — S49.91XA INJURY OF RIGHT SHOULDER, INITIAL ENCOUNTER: ICD-10-CM

## 2019-12-06 DIAGNOSIS — F33.41 RECURRENT MAJOR DEPRESSIVE DISORDER, IN PARTIAL REMISSION (HCC): ICD-10-CM

## 2019-12-06 DIAGNOSIS — Z59.9 FINANCIAL DIFFICULTY: ICD-10-CM

## 2019-12-06 DIAGNOSIS — I10 ESSENTIAL HYPERTENSION: ICD-10-CM

## 2019-12-06 DIAGNOSIS — E11.42 DIABETIC POLYNEUROPATHY ASSOCIATED WITH TYPE 2 DIABETES MELLITUS (HCC): Primary | ICD-10-CM

## 2019-12-06 PROCEDURE — G8417 CALC BMI ABV UP PARAM F/U: HCPCS | Performed by: INTERNAL MEDICINE

## 2019-12-06 PROCEDURE — 3017F COLORECTAL CA SCREEN DOC REV: CPT | Performed by: INTERNAL MEDICINE

## 2019-12-06 PROCEDURE — G8427 DOCREV CUR MEDS BY ELIG CLIN: HCPCS | Performed by: INTERNAL MEDICINE

## 2019-12-06 PROCEDURE — 99214 OFFICE O/P EST MOD 30 MIN: CPT | Performed by: INTERNAL MEDICINE

## 2019-12-06 PROCEDURE — 2022F DILAT RTA XM EVC RTNOPTHY: CPT | Performed by: INTERNAL MEDICINE

## 2019-12-06 PROCEDURE — G8484 FLU IMMUNIZE NO ADMIN: HCPCS | Performed by: INTERNAL MEDICINE

## 2019-12-06 PROCEDURE — 3044F HG A1C LEVEL LT 7.0%: CPT | Performed by: INTERNAL MEDICINE

## 2019-12-06 PROCEDURE — 4004F PT TOBACCO SCREEN RCVD TLK: CPT | Performed by: INTERNAL MEDICINE

## 2019-12-06 RX ORDER — GABAPENTIN 300 MG/1
300 CAPSULE ORAL 2 TIMES DAILY
Qty: 60 CAPSULE | Refills: 2 | Status: SHIPPED | OUTPATIENT
Start: 2019-12-06 | End: 2020-01-16 | Stop reason: SDUPTHER

## 2019-12-06 SDOH — ECONOMIC STABILITY - INCOME SECURITY: PROBLEM RELATED TO HOUSING AND ECONOMIC CIRCUMSTANCES, UNSPECIFIED: Z59.9

## 2019-12-06 ASSESSMENT — ENCOUNTER SYMPTOMS
VOMITING: 0
VOICE CHANGE: 0
WHEEZING: 0
TROUBLE SWALLOWING: 0
PHOTOPHOBIA: 0
SHORTNESS OF BREATH: 0
NAUSEA: 0
ABDOMINAL PAIN: 0

## 2019-12-10 ENCOUNTER — OFFICE VISIT (OUTPATIENT)
Dept: PSYCHOLOGY | Age: 54
End: 2019-12-10
Payer: MEDICARE

## 2019-12-10 DIAGNOSIS — F33.2 SEVERE EPISODE OF RECURRENT MAJOR DEPRESSIVE DISORDER, WITHOUT PSYCHOTIC FEATURES (HCC): Primary | ICD-10-CM

## 2019-12-10 PROCEDURE — 90791 PSYCH DIAGNOSTIC EVALUATION: CPT | Performed by: PSYCHOLOGIST

## 2019-12-11 ENCOUNTER — HOSPITAL ENCOUNTER (OUTPATIENT)
Dept: MRI IMAGING | Age: 54
Discharge: HOME | End: 2019-12-11
Payer: MEDICARE

## 2019-12-11 DIAGNOSIS — S49.91XA INJURY OF RIGHT SHOULDER, INITIAL ENCOUNTER: ICD-10-CM

## 2019-12-11 PROCEDURE — 73221 MRI JOINT UPR EXTREM W/O DYE: CPT

## 2019-12-13 ENCOUNTER — TELEPHONE (OUTPATIENT)
Dept: INTERNAL MEDICINE CLINIC | Age: 54
End: 2019-12-13

## 2019-12-16 ENCOUNTER — TELEPHONE (OUTPATIENT)
Dept: ORTHOPEDIC SURGERY | Age: 54
End: 2019-12-16

## 2019-12-16 ENCOUNTER — OFFICE VISIT (OUTPATIENT)
Dept: ORTHOPEDIC SURGERY | Age: 54
End: 2019-12-16
Payer: MEDICARE

## 2019-12-16 VITALS
WEIGHT: 196 LBS | BODY MASS INDEX: 29.03 KG/M2 | SYSTOLIC BLOOD PRESSURE: 182 MMHG | DIASTOLIC BLOOD PRESSURE: 111 MMHG | HEART RATE: 84 BPM | HEIGHT: 69 IN

## 2019-12-16 DIAGNOSIS — S46.011A TRAUMATIC TEAR OF RIGHT ROTATOR CUFF, INITIAL ENCOUNTER: Primary | ICD-10-CM

## 2019-12-16 PROCEDURE — 4004F PT TOBACCO SCREEN RCVD TLK: CPT | Performed by: ORTHOPAEDIC SURGERY

## 2019-12-16 PROCEDURE — G8417 CALC BMI ABV UP PARAM F/U: HCPCS | Performed by: ORTHOPAEDIC SURGERY

## 2019-12-16 PROCEDURE — 3017F COLORECTAL CA SCREEN DOC REV: CPT | Performed by: ORTHOPAEDIC SURGERY

## 2019-12-16 PROCEDURE — 99203 OFFICE O/P NEW LOW 30 MIN: CPT | Performed by: ORTHOPAEDIC SURGERY

## 2019-12-16 PROCEDURE — G8484 FLU IMMUNIZE NO ADMIN: HCPCS | Performed by: ORTHOPAEDIC SURGERY

## 2019-12-16 PROCEDURE — G8427 DOCREV CUR MEDS BY ELIG CLIN: HCPCS | Performed by: ORTHOPAEDIC SURGERY

## 2019-12-16 RX ORDER — TRAMADOL HYDROCHLORIDE 50 MG/1
50 TABLET ORAL EVERY 6 HOURS PRN
Qty: 28 TABLET | Refills: 0 | Status: SHIPPED | OUTPATIENT
Start: 2019-12-16 | End: 2019-12-23

## 2019-12-17 ENCOUNTER — TELEPHONE (OUTPATIENT)
Dept: ORTHOPEDIC SURGERY | Age: 54
End: 2019-12-17

## 2020-01-16 ENCOUNTER — OFFICE VISIT (OUTPATIENT)
Dept: PAIN MANAGEMENT | Age: 55
End: 2020-01-16
Payer: MEDICARE

## 2020-01-16 VITALS
HEIGHT: 70 IN | DIASTOLIC BLOOD PRESSURE: 102 MMHG | BODY MASS INDEX: 30.35 KG/M2 | SYSTOLIC BLOOD PRESSURE: 156 MMHG | HEART RATE: 81 BPM | WEIGHT: 212 LBS

## 2020-01-16 DIAGNOSIS — R53.83 FATIGUE, UNSPECIFIED TYPE: ICD-10-CM

## 2020-01-16 DIAGNOSIS — G89.4 CHRONIC PAIN SYNDROME: ICD-10-CM

## 2020-01-16 PROBLEM — G47.00 INSOMNIA: Status: ACTIVE | Noted: 2020-01-16

## 2020-01-16 PROBLEM — S88.912S: Status: ACTIVE | Noted: 2020-01-16

## 2020-01-16 PROBLEM — S48.912S: Status: ACTIVE | Noted: 2020-01-16

## 2020-01-16 PROBLEM — S47.2XXS: Status: ACTIVE | Noted: 2020-01-16

## 2020-01-16 PROBLEM — S87.82XS: Status: ACTIVE | Noted: 2020-01-16

## 2020-01-16 PROBLEM — M79.18 MYOFASCIAL PAIN: Status: ACTIVE | Noted: 2020-01-16

## 2020-01-16 LAB
A/G RATIO: 1.3 (ref 1.1–2.2)
ALBUMIN SERPL-MCNC: 4.5 G/DL (ref 3.4–5)
ALP BLD-CCNC: 110 U/L (ref 40–129)
ALT SERPL-CCNC: 24 U/L (ref 10–40)
ANION GAP SERPL CALCULATED.3IONS-SCNC: 15 MMOL/L (ref 3–16)
AST SERPL-CCNC: 28 U/L (ref 15–37)
BILIRUB SERPL-MCNC: 0.3 MG/DL (ref 0–1)
BUN BLDV-MCNC: 6 MG/DL (ref 7–20)
CALCIUM SERPL-MCNC: 10.2 MG/DL (ref 8.3–10.6)
CHLORIDE BLD-SCNC: 94 MMOL/L (ref 99–110)
CO2: 25 MMOL/L (ref 21–32)
CREAT SERPL-MCNC: 0.5 MG/DL (ref 0.9–1.3)
GFR AFRICAN AMERICAN: >60
GFR NON-AFRICAN AMERICAN: >60
GLOBULIN: 3.6 G/DL
GLUCOSE BLD-MCNC: 134 MG/DL (ref 70–99)
HCT VFR BLD CALC: 45.1 % (ref 40.5–52.5)
HEMOGLOBIN: 15.6 G/DL (ref 13.5–17.5)
MCH RBC QN AUTO: 34.3 PG (ref 26–34)
MCHC RBC AUTO-ENTMCNC: 34.5 G/DL (ref 31–36)
MCV RBC AUTO: 99.4 FL (ref 80–100)
PDW BLD-RTO: 14.4 % (ref 12.4–15.4)
PLATELET # BLD: 292 K/UL (ref 135–450)
PMV BLD AUTO: 8.7 FL (ref 5–10.5)
POTASSIUM SERPL-SCNC: 4.4 MMOL/L (ref 3.5–5.1)
RBC # BLD: 4.54 M/UL (ref 4.2–5.9)
SODIUM BLD-SCNC: 134 MMOL/L (ref 136–145)
TOTAL PROTEIN: 8.1 G/DL (ref 6.4–8.2)
TSH SERPL DL<=0.05 MIU/L-ACNC: 1.36 UIU/ML (ref 0.27–4.2)
WBC # BLD: 9 K/UL (ref 4–11)

## 2020-01-16 PROCEDURE — G8484 FLU IMMUNIZE NO ADMIN: HCPCS | Performed by: INTERNAL MEDICINE

## 2020-01-16 PROCEDURE — 3017F COLORECTAL CA SCREEN DOC REV: CPT | Performed by: INTERNAL MEDICINE

## 2020-01-16 PROCEDURE — 2022F DILAT RTA XM EVC RTNOPTHY: CPT | Performed by: INTERNAL MEDICINE

## 2020-01-16 PROCEDURE — G8417 CALC BMI ABV UP PARAM F/U: HCPCS | Performed by: INTERNAL MEDICINE

## 2020-01-16 PROCEDURE — G8427 DOCREV CUR MEDS BY ELIG CLIN: HCPCS | Performed by: INTERNAL MEDICINE

## 2020-01-16 PROCEDURE — 3046F HEMOGLOBIN A1C LEVEL >9.0%: CPT | Performed by: INTERNAL MEDICINE

## 2020-01-16 PROCEDURE — 99214 OFFICE O/P EST MOD 30 MIN: CPT | Performed by: INTERNAL MEDICINE

## 2020-01-16 PROCEDURE — 4004F PT TOBACCO SCREEN RCVD TLK: CPT | Performed by: INTERNAL MEDICINE

## 2020-01-16 RX ORDER — CELECOXIB 200 MG/1
200 CAPSULE ORAL DAILY
Qty: 60 CAPSULE | Refills: 0 | Status: SHIPPED | OUTPATIENT
Start: 2020-01-16 | End: 2020-02-06 | Stop reason: SDUPTHER

## 2020-01-16 RX ORDER — NORTRIPTYLINE HYDROCHLORIDE 25 MG/1
25-50 CAPSULE ORAL NIGHTLY
Qty: 60 CAPSULE | Refills: 0 | Status: SHIPPED | OUTPATIENT
Start: 2020-01-16 | End: 2020-02-06 | Stop reason: SDUPTHER

## 2020-01-16 RX ORDER — DULOXETIN HYDROCHLORIDE 30 MG/1
30 CAPSULE, DELAYED RELEASE ORAL DAILY
Qty: 30 CAPSULE | Refills: 0 | Status: SHIPPED | OUTPATIENT
Start: 2020-01-16 | End: 2020-02-06 | Stop reason: SDUPTHER

## 2020-01-16 RX ORDER — GABAPENTIN 300 MG/1
300 CAPSULE ORAL 3 TIMES DAILY
Qty: 90 CAPSULE | Refills: 0 | Status: SHIPPED | OUTPATIENT
Start: 2020-01-16 | End: 2020-02-06 | Stop reason: SDUPTHER

## 2020-01-23 ENCOUNTER — CARE COORDINATION (OUTPATIENT)
Dept: CARE COORDINATION | Age: 55
End: 2020-01-23

## 2020-01-23 ENCOUNTER — TELEPHONE (OUTPATIENT)
Dept: INTERNAL MEDICINE CLINIC | Age: 55
End: 2020-01-23

## 2020-01-23 NOTE — LETTER
1/23/2020    Aquilino Jerry      Dear Aquilino Suarez,    My name is Quin Sesay and I am a registered nurse who partners with Bari Goodrich MD to improve patients' health. Bari Goodrich MD believes you would benefit from working with me. As a member of your health care team, I would work with other providers involved in your care, offer education for your specific health conditions, and connect you with additional resources as needed. I will collaborate with Bari Goodrich MD to support you in following your treatment plan. The additional support I provide is no additional cost to you. My primary focus is to help you achieve specific goals and improve your health. We are committed to walk with you on this journey and look forward to working with you. Please call me to further discuss your healthcare needs. I am available by phone or for appointments at the office. You can reach me at .     In good health,     Quin Sesay RN

## 2020-01-24 ENCOUNTER — CARE COORDINATION (OUTPATIENT)
Dept: CARE COORDINATION | Age: 55
End: 2020-01-24

## 2020-01-24 SDOH — SOCIAL STABILITY: SOCIAL NETWORK: IN A TYPICAL WEEK, HOW MANY TIMES DO YOU TALK ON THE PHONE WITH FAMILY, FRIENDS, OR NEIGHBORS?: NEVER

## 2020-01-24 SDOH — SOCIAL STABILITY: SOCIAL NETWORK: HOW OFTEN DO YOU GET TOGETHER WITH FRIENDS OR RELATIVES?: NEVER

## 2020-01-24 SDOH — HEALTH STABILITY: MENTAL HEALTH
STRESS IS WHEN SOMEONE FEELS TENSE, NERVOUS, ANXIOUS, OR CAN'T SLEEP AT NIGHT BECAUSE THEIR MIND IS TROUBLED. HOW STRESSED ARE YOU?: VERY MUCH

## 2020-01-24 SDOH — HEALTH STABILITY: PHYSICAL HEALTH: ON AVERAGE, HOW MANY DAYS PER WEEK DO YOU ENGAGE IN MODERATE TO STRENUOUS EXERCISE (LIKE A BRISK WALK)?: 0 DAYS

## 2020-01-24 SDOH — SOCIAL STABILITY: SOCIAL NETWORK: HOW OFTEN DO YOU ATTEND CHURCH OR RELIGIOUS SERVICES?: NEVER

## 2020-01-24 SDOH — ECONOMIC STABILITY: TRANSPORTATION INSECURITY
IN THE PAST 12 MONTHS, HAS THE LACK OF TRANSPORTATION KEPT YOU FROM MEDICAL APPOINTMENTS OR FROM GETTING MEDICATIONS?: YES

## 2020-01-24 SDOH — ECONOMIC STABILITY: TRANSPORTATION INSECURITY
IN THE PAST 12 MONTHS, HAS LACK OF TRANSPORTATION KEPT YOU FROM MEETINGS, WORK, OR FROM GETTING THINGS NEEDED FOR DAILY LIVING?: YES

## 2020-01-24 SDOH — SOCIAL STABILITY: SOCIAL NETWORK
DO YOU BELONG TO ANY CLUBS OR ORGANIZATIONS SUCH AS CHURCH GROUPS UNIONS, FRATERNAL OR ATHLETIC GROUPS, OR SCHOOL GROUPS?: NO

## 2020-01-24 SDOH — HEALTH STABILITY: PHYSICAL HEALTH: ON AVERAGE, HOW MANY MINUTES DO YOU ENGAGE IN EXERCISE AT THIS LEVEL?: 0 MIN

## 2020-01-24 SDOH — ECONOMIC STABILITY: INCOME INSECURITY: HOW HARD IS IT FOR YOU TO PAY FOR THE VERY BASICS LIKE FOOD, HOUSING, MEDICAL CARE, AND HEATING?: VERY HARD

## 2020-01-24 SDOH — SOCIAL STABILITY: SOCIAL NETWORK: HOW OFTEN DO YOU ATTENT MEETINGS OF THE CLUB OR ORGANIZATION YOU BELONG TO?: NEVER

## 2020-01-24 NOTE — TELEPHONE ENCOUNTER
I agree with the [place]   He needs to follow recommendation given by orthopedic surgeon. We can refer him to pain management of his choice.

## 2020-01-24 NOTE — CARE COORDINATION
follow up and meeting patient to discuss and provide community resources (has no mailing address). Review non pharm pain management. Ambulatory Care Coordination Assessment    Care Coordination Protocol  Program Enrollment:  Complex Care  Referral from Primary Care Provider:  No  Week 1 - Initial Assessment              Current Housing:  Homeless                 Thinking about your patient's physical health needs, are there any symptoms or problems (risk indicators) you are unsure about that require further investigation?:  Severe symptoms or problems that cause significant impact on daily life   Are the patients physical health problems impacting on their mental well-being?:  Severe impact upon mental well-being and preventing engagement with usual activities   Are there any problems with your patients lifestyle behaviors (alcohol, drugs, diet, exercise) that are impacting on physical or mental well-being?:  Severe impact on well-being with additional potential impact on others   Do you have any other concerns about your patients mental well-being?  How would you rate their severity and impact on the patient?:  Severe problems impairing most daily functions   How would you rate their home environment in terms of safety and stability (including domestic violence, insecure housing, neighbor harassment)?:  Unsafe and unstable   How do daily activities impact on the patient's well-being? (include current or anticipated unemployment, work, caregiving, access to transportation or other):  Severe impact on poor mental well-being   How would you rate their social network (family, work, friends)?:  Little participation, lonely and socially isolated   How would you rate their financial resources (including ability to afford all required medical care)?:  [address] insecure, very few resources, immediate challenges   How wells does the patient now understand their health and well-being (symptoms, signs or risk daily (with breakfast) 11/19/19   Vandana Pinedo MD   losartan (COZAAR) 50 MG tablet Take 1 tablet by mouth daily  Patient not taking: Reported on 1/16/2020 11/19/19   M Arturo Elaine MD   atorvastatin (LIPITOR) 10 MG tablet Take 1 tablet by mouth daily  Patient not taking: Reported on 1/16/2020 11/19/19   Vandana Pinedo MD   Lancets MISC 1 each by Does not apply route daily 7/31/19   M Arturo Elaine MD   blood glucose monitor strips Test one time a day 7/31/19   Vandana Pinedo MD   blood glucose monitor kit and supplies Test 1 time a day & as needed for symptoms of irregular blood glucose.  7/30/19   Vandana Pinedo MD   aspirin EC 81 MG EC tablet Take 1 tablet by mouth daily  Patient not taking: Reported on 1/16/2020 6/27/19   Vandana Pinedo MD       Future Appointments   Date Time Provider Jemima Pagani   2/6/2020  2:40 PM Dennis Rodriguez MD [facility]     ,   Diabetes Assessment            and   General Assessment    Do you have any symptoms that are causing concern?:  Yes  Progression since Onset:  Unchanged  Reported Symptoms:  Pain (Comment: Pain in bilateral shoulders and has little range of motion.)

## 2020-01-28 ENCOUNTER — CARE COORDINATION (OUTPATIENT)
Dept: CARE COORDINATION | Age: 55
End: 2020-01-28

## 2020-01-28 NOTE — CARE COORDINATION
Medication Sig Start Date End Date Taking? Authorizing Provider   gabapentin (NEURONTIN) 300 MG capsule Take 1 capsule by mouth 3 times daily for 30 days. 1/16/20 2/15/20  Susie Tavarez MD   celecoxib (CELEBREX) 200 MG capsule Take 1 capsule by mouth daily 1/16/20   Susie Tavarez MD   nortriptyline (PAMELOR) 25 MG capsule Take 1-2 capsules by mouth nightly 1/16/20   Susie Tavarez MD   DULoxetine (CYMBALTA) 30 MG extended release capsule Take 1 capsule by mouth daily 1/16/20   Susie Tavarez MD   metFORMIN (GLUCOPHAGE) 500 MG tablet Take 1 tablet by mouth daily (with breakfast) 11/19/19   Skyla Matthews MD   losartan (COZAAR) 50 MG tablet Take 1 tablet by mouth daily  Patient not taking: Reported on 1/16/2020 11/19/19   Skyla Matthews MD   atorvastatin (LIPITOR) 10 MG tablet Take 1 tablet by mouth daily  Patient not taking: Reported on 1/16/2020 11/19/19   Skyla Matthews MD   acetaminophen (TYLENOL) 500 MG tablet Take 500 mg by mouth every 6 hours as needed for Pain (max 3000 mg /24 hr)    Historical Provider, MD   Lancets MISC 1 each by Does not apply route daily 7/31/19   Skyla Matthews MD   blood glucose monitor strips Test one time a day 7/31/19   M Eduardo Duane, MD   blood glucose monitor kit and supplies Test 1 time a day & as needed for symptoms of irregular blood glucose. 7/30/19   Skyla Matthews MD   aspirin EC 81 MG EC tablet Take 1 tablet by mouth daily  Patient not taking: Reported on 1/16/2020 6/27/19   Skyla Matthews MD       Future Appointments   Date Time Provider Jemima Zapata   2/6/2020  2:40 PM Susie Tavarez MD [facility]     ,   Diabetes Assessment    How often do you test your blood sugar?:  No Testing   Do you have barriers with adherence to non-pharmacologic self-management interventions?  (Nutrition/Exercise/Self-Monitoring):  Yes       Blood Sugar Monitoring Regimen:  Not Testing       and   General Assessment    Do you have any symptoms that are causing concern?: Yes  Progression since Onset:  Unchanged  Reported Symptoms:  Pain (Comment: shoulders, generalized body aches and pain)

## 2020-01-31 NOTE — PROGRESS NOTES
motion, no tenderness, supple. No bruit, no JVD, No lymph nodes appreciated. Thyroid is not enlarged  Respiratory: Lungs CTAP, No respiratory distress, normal breath sounds, no rales, no wheezing, decreased air exchange  Cardiovascular:  Normal S1,S2, Normal rate, normal rhythm, no murmurs, no gallops, no rubs   GI:  Soft, nondistended, normal bowel sounds, nontender, no organomegaly, no mass, no rebound, no guarding, obese  :  No costovertebral angle tenderness   Musculoskeletal:  No clubbing, no edema, no tenderness, no deformities. There are no varicosities  Lymphatic:  No lymphadenopathy noted   Neurologic:  Alert & oriented x 3, CN 2-12 normal, normal motor function, normal sensory function, no focal deficits noted   Psychiatric:  Speech and behavior appropriate, judgement and thought content normal.  There are tender spots of myofascial pain physical exam testing    Patient's old records reviewed and detailed records from primary care physician reviewed imaging studies reviewed x-ray of the shoulder show arthritis of the glenohumeral and AC joints chronic rotator cuff disease CT of the head was reviewed from September 2019. IMPRESSION    CHRONIC PAIN SYNDROME  MYOFASCIAL PAIN  TRAUMATIC INJURY FOOT LEG AND ARM  DIABETIC PERIPHERAL NEUROPATHY  INSOMNIA    Chronic opiate treatment protocol was discussed with the patient. Informed verbal consent was obtained. Treatment guidelines were established. Risks and benefits of the medications including narcotics were discussed with the patient. SOAPP questionnaire and opioid risk tool were assessed. Long-term and short-term goals of pain management were also addressed including pain relief about 30% from baseline, improving mood, sleep, psychosocial, and physical functioning were addressed.   Advised to increase the Neurontin 300 mg 3 times a day and Celebrex 200 mg 1 a day we will start that along with nortriptyline 25 mg 1-2 at bedtime we will also start Cymbalta 30 mg 1 a day. We will do urine drug screen with [facility] opiates and follow-up on the results. Patient profile on OARRS website was reviewed. Will order CBC CMP UA TSH and follow-up on the results. All treatment options are discussed with patient. Monitor blood sugar regularly, diabetic control- adv diabetic diet. Goal for fasting blood sugars around 120. Follow up with Endocrinologist/PCP also for on going management   Goals of current treatment regimen include improvement in pain, restoration of functioning- with focus on improvement in physical performance, general activity, work or disability,emotional distress, health care utilization and  decreased medication consumption. Will continue to monitor progress towards achieving/maintaining therapeutic goals with special emphasis on  1. Improvement in perceived interfernce  of pain with ADL's. Ability to do home exercises independently. Ability to do household chores indoor and/or outdoor work and social and leisure activities. To increase flexibility/ROM, strength and endurance. Improve psychosocial and physical functioning. 2. Improving sleep to 6-7 hours a night. Improve mood/ anxiety and depression symptoms such as crying spells, low energy, problems with concentration, motivation. 3. Reduction of reliance on opioid analgesia/more appropriate opioid use. Risks and benefits of the medications and other alternative treatments have been/were  discussed with the patient. Any questions on the  common side effects of these medications were also answered. Informed verbal consent was obtained. The current treatment regimen is needed to decrease the patient's pain  symptoms, improve the quality of life and ability to function and improve the  sleep and mood symptoms.    Patient was advised against drinking alcohol with the narcotic pain medicines, advised against driving or handling machinery when  starting or adjusting the dose of medicines, feeling groggy or drowsy, or if having any cognitive issues related to the current medications. Patient is fully aware of the risk of overdose and death, if medicines are misused and not taken as prescribed. If Patient develops new symptoms or if the symptoms worsen,  was told to call the office. Thank you for allowing me to participate in the care of this patient. MD Sarah Ball disclaimer: This note was dictated utilizing voice recognition software. Minor errors in transcription may be present.     CC:  Rhoda Ho MD

## 2020-02-06 ENCOUNTER — OFFICE VISIT (OUTPATIENT)
Dept: PAIN MANAGEMENT | Age: 55
End: 2020-02-06
Payer: MEDICARE

## 2020-02-06 VITALS
DIASTOLIC BLOOD PRESSURE: 104 MMHG | BODY MASS INDEX: 30.42 KG/M2 | SYSTOLIC BLOOD PRESSURE: 153 MMHG | HEART RATE: 98 BPM | WEIGHT: 212 LBS

## 2020-02-06 PROBLEM — S47.2XXS: Status: RESOLVED | Noted: 2020-01-16 | Resolved: 2020-02-06

## 2020-02-06 PROBLEM — S48.912S: Status: RESOLVED | Noted: 2020-01-16 | Resolved: 2020-02-06

## 2020-02-06 PROBLEM — S87.82XS: Status: RESOLVED | Noted: 2020-01-16 | Resolved: 2020-02-06

## 2020-02-06 PROBLEM — S88.912S: Status: RESOLVED | Noted: 2020-01-16 | Resolved: 2020-02-06

## 2020-02-06 PROCEDURE — 99214 OFFICE O/P EST MOD 30 MIN: CPT | Performed by: INTERNAL MEDICINE

## 2020-02-06 PROCEDURE — 4004F PT TOBACCO SCREEN RCVD TLK: CPT | Performed by: INTERNAL MEDICINE

## 2020-02-06 PROCEDURE — 3017F COLORECTAL CA SCREEN DOC REV: CPT | Performed by: INTERNAL MEDICINE

## 2020-02-06 PROCEDURE — 3046F HEMOGLOBIN A1C LEVEL >9.0%: CPT | Performed by: INTERNAL MEDICINE

## 2020-02-06 PROCEDURE — 2022F DILAT RTA XM EVC RTNOPTHY: CPT | Performed by: INTERNAL MEDICINE

## 2020-02-06 PROCEDURE — G8484 FLU IMMUNIZE NO ADMIN: HCPCS | Performed by: INTERNAL MEDICINE

## 2020-02-06 PROCEDURE — G8427 DOCREV CUR MEDS BY ELIG CLIN: HCPCS | Performed by: INTERNAL MEDICINE

## 2020-02-06 PROCEDURE — G8417 CALC BMI ABV UP PARAM F/U: HCPCS | Performed by: INTERNAL MEDICINE

## 2020-02-06 RX ORDER — NORTRIPTYLINE HYDROCHLORIDE 25 MG/1
25-50 CAPSULE ORAL NIGHTLY
Qty: 60 CAPSULE | Refills: 0 | Status: SHIPPED | OUTPATIENT
Start: 2020-02-06 | End: 2020-04-14 | Stop reason: SDUPTHER

## 2020-02-06 RX ORDER — GABAPENTIN 300 MG/1
300 CAPSULE ORAL 3 TIMES DAILY
Qty: 90 CAPSULE | Refills: 0 | Status: SHIPPED | OUTPATIENT
Start: 2020-02-06 | End: 2020-04-13 | Stop reason: SDUPTHER

## 2020-02-06 RX ORDER — CELECOXIB 200 MG/1
200 CAPSULE ORAL DAILY
Qty: 60 CAPSULE | Refills: 0 | Status: SHIPPED | OUTPATIENT
Start: 2020-02-06 | End: 2020-04-13 | Stop reason: SDUPTHER

## 2020-02-06 RX ORDER — DULOXETIN HYDROCHLORIDE 30 MG/1
30 CAPSULE, DELAYED RELEASE ORAL DAILY
Qty: 30 CAPSULE | Refills: 0 | Status: SHIPPED | OUTPATIENT
Start: 2020-02-06 | End: 2020-04-14 | Stop reason: SDUPTHER

## 2020-02-06 RX ORDER — BUPRENORPHINE 5 UG/H
1 PATCH TRANSDERMAL WEEKLY
Qty: 4 PATCH | Refills: 0 | Status: SHIPPED | OUTPATIENT
Start: 2020-02-06 | End: 2021-09-27 | Stop reason: ALTCHOICE

## 2020-02-06 NOTE — PROGRESS NOTES
Holguinmelita Kuo  1965  [id number]    HISTORY OF PRESENT ILLNESS:  Mr. Osuna is a 47 y.o. male returns for a follow up visit for multiple medical problems. His  presenting problems are   1. Chronic pain syndrome    2. Myofascial pain    3. Primary insomnia    4. Diabetic polyneuropathy associated with type 2 diabetes mellitus ([facility]    5. 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 Sequelae of crushing injury and traumatic am[facility] of [facility]    . As per information/history obtained from the PADT(patient assessment and documentation tool) -  He complains of pain in the neck and lower back with radiation to the shoulders Bilateral, elbows Bilateral, hands Bilateral, buttocks and hips Bilateral He rates the pain 9/10 and describes it as sharp, aching, burning. Pain is made worse by: movement, walking, standing, sitting. Current treatment regimen has helped relieve about 10% of the pain. He denies side effects from the current pain regimen. Patient reports that since the last follow up visit the physical functioning is unchanged, family/social relationships are unchanged, mood is unchanged and sleep patterns are unchanged, and that the overall functioning is unchanged. Patient denies neurological bowel or bladder. Patient denies misusing/abusing his narcotic pain medications or using any illegal drugs. There are No indicators for possible drug abuse, addiction or diversion problems. Upon obtaining the medical history from Mr. Osuna regarding today's office visit for his presenting problems,   complains he is having increase pain, \" the pains are killing me\". He states he was not able to get all his medications. Sleep is poor,  poor sleep latency, averages 2-3 hours of sleep at night. Intermittent, non restorative. Does not feel rested in AM. Complains of feeling sleepy  during the day.  He states that his moods have been depressed, tearful, labile with c/o loss of energy, having occasional crying spells. Denies being suicidal or homicidal He mentions he is lives on his own and its difficult for him to managing his ADLs. He says he has to go for surgery on the right shoulder. ALLERGIES/PAST MED/FAM/SOC HISTORY: Mr. Adriano Roland allergies, past medical, family and social history were reviewed in the chart and also listed below. Social History     Socioeconomic History    Marital status: Single     Spouse name: Not on file    Number of children: Not on file    Years of education: Not on file    Highest education level: Not on file   Occupational History    Not on file   Social Needs    Financial resource strain: Very hard    Food insecurity:     Worry: Not on file     Inability: Not on file    Transportation needs:     Medical: Yes     Non-medical: Yes   Tobacco Use    Smoking status: Current Every Day Smoker     Packs/day: 0.50     Years: 24.00     Pack years: 12.00     Types: Cigarettes     Start date: 1/1/1984    Smokeless tobacco: Never Used   Substance and Sexual Activity    Alcohol use: Yes     Frequency: 4 or more times a week     Drinks per session: 5 or 6     Binge frequency: Weekly    Drug use: Never    Sexual activity: Not Currently   Lifestyle    Physical activity:     Days per week: 0 days     Minutes per session: 0 min    Stress: Very much   Relationships    Social connections:     Talks on phone: Never     Gets together: Never     Attends Rastafarian service: Never     Active member of club or organization: No     Attends meetings of clubs or organizations: Never     Relationship status: Not on file    Intimate partner violence:     Fear of current or ex partner: Not on file     Emotionally abused: Not on file     Physically abused: Not on file     Forced sexual activity: Not on file   Other Topics Concern    Not on file   Social History Narrative    Homeless; sleeps on street sometimes. Sometimes goes to shelters. hallucinations, behavioral problems, self-injury, decreased concentration/cognition, agitation, confusion. PHYSICAL EXAM:   Nursing note and vitals reviewed. BP (!) 153/104   Pulse 98   Wt 212 lb (96.2 kg)   BMI 30.42 kg/m²   General Appearance: Patient is well nourished, well developed, well groomed and in no acute distress. Skin: Skin is warm and dry, good turgor . No rash or lesions noted. He is not diaphoretic. Pulmonary/Chest: Effort normal. No respiratory distress or use of accessory muscles. Auscultation revealing decreased air exchange bilaterally. He does not have wheezes in the lung fields. He has no rales. Cardiovascular: Normal rate, regular rhythm, normal heart sounds, and does not have murmur. Exam reveals no gallop and no friction rub. Abdominal: Soft. Bowel sounds are normal.  On inspection of abdomen is obese no distension and no mass. No tenderness. He has no rebound and no guarding. Musculoskeletal / Extremities: Range of motion is normal. Gait is normal, assistive devices use: none. He exhibits edema: none, and no tenderness. Neurological/Psychiatric:He is alert and oriented to person, place, and time. Coordination is  normal.   Judgement and Insight is normal  His mood is Appropriate and affect is Flat/blunted . His behavior is normal.   thought content normal.    Other> + scar LUE, + guard     IMPRESSION:     1. Crushing injury of left upper extremity, sequela - INADEQUATELY CONTROLLED: treatment plan adjusted as below    2. Chronic pain syndrome - INADEQUATELY CONTROLLED: treatment plan adjusted as below    3. Myofascial pain - INADEQUATELY CONTROLLED: treatment plan adjusted as below    4. Neuropathic pain - INADEQUATELY CONTROLLED: treatment plan adjusted as below    5. Diabetic polyneuropathy associated with type 2 diabetes mellitus ([facility] - INADEQUATELY CONTROLLED: treatment plan adjusted as below    6.  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 Sequelae of crushing injury and traumatic amputation of left lower extremity ([address] - STABLE: Continue current treatment plan        PLAN:  Informed verbal consent was obtained.  -Patient's urine drug screen results with GC/MS confirmation were obtained and reviewed and were negative for any illicit drugs. Prescribed medications were within acceptable range. -Adv Biofeedback, relaxation and meditation techniques. Referral to psychologist for CBT was also discussed with patient   -Restart all his medications   -Start Neurontin 300 mg TID   -Start Celebrex 200 mg daily   -Start Pamelor 25 mg 1-2 at night qty 60   -Start Cymbalta 30 mg daily   -He was advised to increase fluids ( 5-7  glasses of fluid daily), limit caffeine, avoid cheese products, increase dietary fiber, increase activity and exercise as tolerated and relax regularly and enjoy meals   -Start Butrans 5 mg every 7 days   -he was advised proper sleep hygiene-told to avoid:use of caffeine or other stimulants after noon, alcohol use near bedtime, long or frequent naps during the day, erratic sleep schedule, heavy meals near bedtime, vigorous exercise near bedtime and use of electronic devices near bedtime   -Walking. /ROm exercises as advised   -Monitor blood sugar regularly, diabetic control- adv diabetic diet. Goal for fasting blood sugars around 120.  Follow up with Endocrinologist/PCP also for on going management    Mr. 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 2. Improving sleep to 6-7 hours a night. Improve mood/ anxiety and depression symptoms such as crying spells, low energy, problems with concentration, motivation.- he is not showing any significant progress/or showing regression  towards this goal and reassessment and adjustment of goals/treatment have been made. 3. Reduction of reliance on opioid analgesia/more appropriate opioid use. .- he is showing progression towards this treatment goal with the current regimen. Risks and benefits of the medications and other alternative treatments have been/were  discussed with the patient. Any questions on the  common side effects of these medications were also answered. He was advised against drinking alcohol with the narcotic pain medicines, advised against driving or handling machinery when  starting or adjusting the dose of medicines, feeling groggy or drowsy, or if having any cognitive issues related to the current medications. Heis fully aware of the risk of overdose and death, if medicines are misused and not taken as prescribed. If he develops new symptoms or if the symptoms worsen, he was told to call the office. .  Thank you for allowing me to participate in the care of this patient.     Lucius Mendez MD.    Cc: Dr.M Jameson Pepper MD

## 2020-02-12 ENCOUNTER — TELEPHONE (OUTPATIENT)
Dept: ORTHOPEDIC SURGERY | Age: 55
End: 2020-02-12

## 2020-02-19 ENCOUNTER — OFFICE VISIT (OUTPATIENT)
Dept: ORTHOPEDIC SURGERY | Age: 55
End: 2020-02-19
Payer: MEDICARE

## 2020-02-19 VITALS
DIASTOLIC BLOOD PRESSURE: 93 MMHG | WEIGHT: 204 LBS | SYSTOLIC BLOOD PRESSURE: 146 MMHG | BODY MASS INDEX: 29.2 KG/M2 | HEART RATE: 87 BPM | HEIGHT: 70 IN

## 2020-02-19 PROCEDURE — G8427 DOCREV CUR MEDS BY ELIG CLIN: HCPCS | Performed by: ORTHOPAEDIC SURGERY

## 2020-02-19 PROCEDURE — G8417 CALC BMI ABV UP PARAM F/U: HCPCS | Performed by: ORTHOPAEDIC SURGERY

## 2020-02-19 PROCEDURE — 99213 OFFICE O/P EST LOW 20 MIN: CPT | Performed by: ORTHOPAEDIC SURGERY

## 2020-02-19 PROCEDURE — G8484 FLU IMMUNIZE NO ADMIN: HCPCS | Performed by: ORTHOPAEDIC SURGERY

## 2020-02-19 PROCEDURE — 3017F COLORECTAL CA SCREEN DOC REV: CPT | Performed by: ORTHOPAEDIC SURGERY

## 2020-02-19 PROCEDURE — 4004F PT TOBACCO SCREEN RCVD TLK: CPT | Performed by: ORTHOPAEDIC SURGERY

## 2020-02-19 NOTE — PROGRESS NOTES
pursue the option of discharging him to a rehabilitation facility following surgery. Once we are certain this can happen, we will work to arrange surgical intervention. I have reviewed patient's pertinent medical history, relevant laboratory and imaging studies, and past surgical history. Patient's use of relevant medications has been reviewed and was discussed during the visit. Patient was advised to keep future appointments with their respective specialty care team(s). Patient had the opportunity to ask questions, all of which were answered to the best of my ability and with patient satisfaction. Patient understands and is agreeable with the care plan following today's visit. Patient is to schedule an appointment for any new or worsening symptoms. By signing my name below, Luisa Cordova, attest that this documentation has been prepared under the direction and in the presence of Noel Travis MD.   Electronically Signed: Queenie Torres, 2/19/20, 8:24 AM.       I, Noel Travis MD, personally performed the services described in this documentation. All medical record entries made by the sandyibe were at my direction and in my presence. I have reviewed the chart and discharge instructions (if applicable) and agree that the record reflects my personal performance and is accurate and complete. Noel Travis MD, 3/18/20, 9:48 PM EDT. Some documentation was done using voice recognition dragon software. Every effort was made to ensure accuracy; however, inadvertent unintentional computerized transcription errors may be present.

## 2020-02-21 ENCOUNTER — TELEPHONE (OUTPATIENT)
Dept: INTERNAL MEDICINE CLINIC | Age: 55
End: 2020-02-21

## 2020-03-04 ENCOUNTER — TELEPHONE (OUTPATIENT)
Dept: ORTHOPEDIC SURGERY | Age: 55
End: 2020-03-04

## 2020-03-09 ENCOUNTER — TELEPHONE (OUTPATIENT)
Dept: ORTHOPEDIC SURGERY | Age: 55
End: 2020-03-09

## 2020-04-13 ENCOUNTER — VIRTUAL VISIT (OUTPATIENT)
Dept: INTERNAL MEDICINE CLINIC | Age: 55
End: 2020-04-13
Payer: MEDICARE

## 2020-04-13 ENCOUNTER — TELEPHONE (OUTPATIENT)
Dept: INTERNAL MEDICINE CLINIC | Age: 55
End: 2020-04-13

## 2020-04-13 PROCEDURE — 99442 PR PHYS/QHP TELEPHONE EVALUATION 11-20 MIN: CPT | Performed by: INTERNAL MEDICINE

## 2020-04-13 RX ORDER — CELECOXIB 200 MG/1
200 CAPSULE ORAL DAILY
Qty: 60 CAPSULE | Refills: 2 | Status: SHIPPED | OUTPATIENT
Start: 2020-04-13 | End: 2021-12-10

## 2020-04-13 RX ORDER — GABAPENTIN 300 MG/1
300 CAPSULE ORAL 3 TIMES DAILY
Qty: 90 CAPSULE | Refills: 0 | Status: SHIPPED | OUTPATIENT
Start: 2020-04-13 | End: 2020-06-12

## 2020-04-14 ENCOUNTER — CARE COORDINATION (OUTPATIENT)
Dept: CARE COORDINATION | Age: 55
End: 2020-04-14

## 2020-04-14 RX ORDER — LOSARTAN POTASSIUM 50 MG/1
50 TABLET ORAL DAILY
Qty: 90 TABLET | Refills: 1 | Status: SHIPPED | OUTPATIENT
Start: 2020-04-14

## 2020-04-14 RX ORDER — NORTRIPTYLINE HYDROCHLORIDE 25 MG/1
25-50 CAPSULE ORAL NIGHTLY
Qty: 60 CAPSULE | Refills: 0 | Status: SHIPPED | OUTPATIENT
Start: 2020-04-14 | End: 2020-06-17

## 2020-04-14 RX ORDER — ATORVASTATIN CALCIUM 10 MG/1
10 TABLET, FILM COATED ORAL DAILY
Qty: 90 TABLET | Refills: 1 | Status: SHIPPED | OUTPATIENT
Start: 2020-04-14 | End: 2020-10-27

## 2020-04-14 RX ORDER — DULOXETIN HYDROCHLORIDE 30 MG/1
30 CAPSULE, DELAYED RELEASE ORAL DAILY
Qty: 30 CAPSULE | Refills: 0 | Status: SHIPPED | OUTPATIENT
Start: 2020-04-14 | End: 2020-06-17

## 2020-04-27 ENCOUNTER — TELEPHONE (OUTPATIENT)
Dept: INTERNAL MEDICINE CLINIC | Age: 55
End: 2020-04-27

## 2020-06-17 ENCOUNTER — TELEPHONE (OUTPATIENT)
Dept: INTERNAL MEDICINE CLINIC | Age: 55
End: 2020-06-17

## 2020-06-17 ENCOUNTER — VIRTUAL VISIT (OUTPATIENT)
Dept: INTERNAL MEDICINE CLINIC | Age: 55
End: 2020-06-17
Payer: MEDICARE

## 2020-06-17 PROCEDURE — 99442 PR PHYS/QHP TELEPHONE EVALUATION 11-20 MIN: CPT | Performed by: INTERNAL MEDICINE

## 2020-06-18 NOTE — PROGRESS NOTES
Called and lm to taper off gabapentin
hours.     Patient identification was verified at the start of the visit: Yes    Total Time: minutes: 11-20 minutes    Note: not billable if this call serves to triage the patient into an appointment for the relevant concern      Florecita Alexis

## 2021-09-27 ENCOUNTER — OFFICE VISIT (OUTPATIENT)
Dept: PAIN MANAGEMENT | Age: 56
End: 2021-09-27
Payer: MEDICARE

## 2021-09-27 VITALS
SYSTOLIC BLOOD PRESSURE: 117 MMHG | HEART RATE: 76 BPM | TEMPERATURE: 97.7 F | DIASTOLIC BLOOD PRESSURE: 75 MMHG | OXYGEN SATURATION: 98 %

## 2021-09-27 DIAGNOSIS — F33.1 MODERATE EPISODE OF RECURRENT MAJOR DEPRESSIVE DISORDER (HCC): ICD-10-CM

## 2021-09-27 DIAGNOSIS — G54.6 PHANTOM LIMB PAIN (HCC): ICD-10-CM

## 2021-09-27 DIAGNOSIS — I73.9 PVD (PERIPHERAL VASCULAR DISEASE) (HCC): ICD-10-CM

## 2021-09-27 DIAGNOSIS — L40.9 PSORIASIS: ICD-10-CM

## 2021-09-27 DIAGNOSIS — F51.01 PRIMARY INSOMNIA: ICD-10-CM

## 2021-09-27 DIAGNOSIS — G89.4 CHRONIC PAIN SYNDROME: ICD-10-CM

## 2021-09-27 DIAGNOSIS — E08.42 DIABETIC POLYNEUROPATHY ASSOCIATED WITH DIABETES MELLITUS DUE TO UNDERLYING CONDITION (HCC): ICD-10-CM

## 2021-09-27 DIAGNOSIS — M79.18 MYOFASCIAL PAIN: ICD-10-CM

## 2021-09-27 PROCEDURE — 3017F COLORECTAL CA SCREEN DOC REV: CPT | Performed by: INTERNAL MEDICINE

## 2021-09-27 PROCEDURE — 99214 OFFICE O/P EST MOD 30 MIN: CPT | Performed by: INTERNAL MEDICINE

## 2021-09-27 PROCEDURE — 4004F PT TOBACCO SCREEN RCVD TLK: CPT | Performed by: INTERNAL MEDICINE

## 2021-09-27 PROCEDURE — G8421 BMI NOT CALCULATED: HCPCS | Performed by: INTERNAL MEDICINE

## 2021-09-27 PROCEDURE — 2022F DILAT RTA XM EVC RTNOPTHY: CPT | Performed by: INTERNAL MEDICINE

## 2021-09-27 PROCEDURE — G8427 DOCREV CUR MEDS BY ELIG CLIN: HCPCS | Performed by: INTERNAL MEDICINE

## 2021-09-27 RX ORDER — DULOXETIN HYDROCHLORIDE 60 MG/1
60 CAPSULE, DELAYED RELEASE ORAL DAILY
Qty: 30 CAPSULE | Refills: 0 | Status: SHIPPED | OUTPATIENT
Start: 2021-09-27 | End: 2021-10-28

## 2021-09-27 RX ORDER — AMITRIPTYLINE HYDROCHLORIDE 25 MG/1
25-50 TABLET, FILM COATED ORAL NIGHTLY
Qty: 60 TABLET | Refills: 0 | Status: SHIPPED | OUTPATIENT
Start: 2021-09-27 | End: 2021-10-28 | Stop reason: SDUPTHER

## 2021-09-27 RX ORDER — PREGABALIN 150 MG/1
150 CAPSULE ORAL 3 TIMES DAILY
Qty: 90 CAPSULE | Refills: 0 | Status: SHIPPED | OUTPATIENT
Start: 2021-09-27 | End: 2021-10-28

## 2021-09-27 RX ORDER — BUPRENORPHINE 7.5 UG/H
1 PATCH TRANSDERMAL WEEKLY
Qty: 4 PATCH | Refills: 0 | Status: SHIPPED | OUTPATIENT
Start: 2021-09-27 | End: 2021-10-28 | Stop reason: SDUPTHER

## 2021-09-27 NOTE — PROGRESS NOTES
Ileana Mood  1965  [id number]    HISTORY OF PRESENT ILLNESS:  Mr. Osuna is a 54 y.o. male returns for a follow up visit for multiple medical problems. His  presenting problems are   1. Chronic pain syndrome    2. Myofascial pain    . As per information/history obtained from the PADT(patient assessment and documentation tool) -  He complains of pain in the lower back with radiation to the shoulders Left, hands Left, buttocks, hips Left, upper leg Left, knees Left, lower leg Left, ankles Left and feet Left He rates the pain 10/10 and describes it as sharp, burning, numbness. Pain is made worse by: movement, walking, standing, sitting, bending, lifting. Current treatment regimen has helped relieve about 20% of the pain. He denies side effects from the current pain regimen. Patient reports that since the last follow up visit the physical functioning is worse, family/social relationships are worse, mood is worse and sleep patterns are worse, and that the overall functioning is worse. Patient denies neurological bowel or bladder. Patient denies misusing/abusing his narcotic pain medications or using any illegal drugs. There are No indicators for possible drug abuse, addiction or diversion problems. Upon obtaining the medical history from Mr. Osuna regarding today's office visit for his presenting problems, patient was last seen on 2/2020. Mr. Osuna is complaining of increase pain over the last few months. He states he had surgery on the left leg, he had AKA for PVD/PAD left leg. Patient is complaining of phantom pains and pain in the back. He states he is having problems with flare up of psoriasis also. Patient states he was getting some medications from vascular surgeon but not working, he is on Neurontin 600 mg TID but not helping. Sleep is poor,  poor sleep latency, averages 2-3 hours of sleep at night. Intermittent, non restorative.  Does not feel rested in AM. Complains of feeling sleepy  during the day. He states that his moods have been depressed, tearful, labile with c/o loss of energy, having occasional crying spells. Denies being suicidal or homicidal. Patient has been non compliant with follow up and treatment plan in the past.        ALLERGIES/PAST MED/FAM/SOC HISTORY: Mr. Osuna allergies, past medical, family and social history were reviewed in the chart. Mr. Osuna current medications are   Outpatient Medications Prior to Visit   Medication Sig Dispense Refill    atorvastatin (LIPITOR) 10 MG tablet TAKE 1 TABLET BY MOUTH ONE TIME A DAY  90 tablet 0    losartan (COZAAR) 50 MG tablet Take 1 tablet by mouth daily 90 tablet 1    metFORMIN (GLUCOPHAGE) 500 MG tablet TAKE 1 TABLET BY MOUTH DAILY WITH BREAKFAST 30 tablet 2    celecoxib (CELEBREX) 200 MG capsule Take 1 capsule by mouth daily 60 capsule 2    acetaminophen (TYLENOL) 500 MG tablet Take 500 mg by mouth every 6 hours as needed for Pain (max 3000 mg /24 hr)      Lancets MISC 1 each by Does not apply route daily 100 each 3    blood glucose monitor strips Test one time a day 100 strip 3    blood glucose monitor kit and supplies Test 1 time a day & as needed for symptoms of irregular blood glucose. 1 kit 0    aspirin EC 81 MG EC tablet Take 1 tablet by mouth daily 90 tablet 1    gabapentin (NEURONTIN) 300 MG capsule TAKE 1 CAPSULE BY MOUTH THREE TIMES A DAY  90 capsule 0     No facility-administered medications prior to visit. REVIEW OF SYSTEMS: .   Respiratory: Negative for shortness of breath. Cardiovascular: Negative for chest pain, palpitations  Gastrointestinal: Negative for blood in stool, abdominal distention, nausea, vomiting, abdominal pain, diarrhea,constipation.   Neurological: Negative for speech difficulty, weakness and light-headedness, dizziness, tremors, sleepiness  Psychiatric/Behavioral: Negative for suicidal ideas, hallucinations, behavioral problems, self-injury, decreased concentration/cognition, agitation, confusion. PHYSICAL EXAM:   Nursing note and vitals reviewed. /75   Pulse 76   Temp 97.7 °F (36.5 °C) (Temporal)   SpO2 98%   General Appearance: Patient is well nourished, well developed, well groomed and in no acute distress. Skin: Skin is warm and dry, good turgor . No rash or lesions noted. He is not diaphoretic.  +psoriasis    Pulmonary/Chest: Effort normal. No respiratory distress or use of accessory muscles. Auscultation revealing decreased air exchange bilaterally. He does not have wheezes in the lung fields. He has no rales. Cardiovascular: Normal rate, regular rhythm, normal heart sounds, and does not have murmur. Exam reveals no gallop and no friction rub. Musculoskeletal / Extremities: Range of motion is normal. Gait is normal, assistive devices use: wheelchair, left AKA. He exhibits edema: none, and no tenderness. Neurological/Psychiatric:He is alert and oriented to person, place, and time. Coordination is  normal.   Judgement and Insight is normal  His mood is Appropriate and affect is Flat/blunted . His behavior is normal.   thought content normal.        IMPRESSION:     1. Chronic pain syndrome - INADEQUATELY CONTROLLED: treatment plan adjusted as below    2. Myofascial pain  - INADEQUATELY CONTROLLED: treatment plan adjusted as below   3. Diabetic polyneuropathy associated with diabetes mellitus due to underlying condition ([facility] [address]  - INADEQUATELY CONTROLLED: treatment plan adjusted as below   4. Primary insomnia  - INADEQUATELY CONTROLLED: treatment plan adjusted as below   5. PVD (peripheral vascular disease) ([facility] [address]  - INADEQUATELY CONTROLLED: treatment plan adjusted as below   6. Psoriasis  - INADEQUATELY CONTROLLED: treatment plan adjusted as below   7. Phantom limb pain ([facility] [address]  - INADEQUATELY CONTROLLED: treatment plan adjusted as below   8.  Moderate episode of recurrent major depressive disorder (HCC)  - INADEQUATELY CONTROLLED: treatment plan adjusted as below PLAN:  Informed verbal consent was obtained. -[place] record was obtained and reviewed  for the last one year and no indicators of drug misuse  were found. Any other controlled substance prescriptions  seen on the record have been accounted for, I am aware of the patient receiving these medications. Brina Shi [place] record will be rechecked as part of office protocol.    -Interm history reviewed    -Most recent labs were reviewed and are baseline   -Medications reviewed   -Start Lyrica 150 mg TID   -Discontinue Neurontin, he was on 1800 mg which did not help  -Start Elavil 25 mg 1-2 nightly   -Increase Cymbalta to 60 mg   -Monitor blood sugar regularly, diabetic control- adv diabetic diet. Goal for fasting blood sugars around 120. Follow up with Endocrinologist/PCP also for on going management    -Increase Butrans to 7.5 mcg Q 7 days   -Advised patient to quit smoking for  health related concerns and to improve the treatment outcomes. Education was given on quitting smoking and the use of different modalities including medications, hypnotherapy, counselling  and biofeedback. These were discussed with patient. Mr. Osuna will be prescribed  the medications  listed below which are for treatment of his presenting  medical problems which for this visit include:   Diagnoses of Chronic pain syndrome and Myofascial pain were pertinent to this visit.   Medications/orders associated with this visit:    Current Outpatient Medications   Medication Sig Dispense Refill    atorvastatin (LIPITOR) 10 MG tablet TAKE 1 TABLET BY MOUTH ONE TIME A DAY  90 tablet 0    losartan (COZAAR) 50 MG tablet Take 1 tablet by mouth daily 90 tablet 1    metFORMIN (GLUCOPHAGE) 500 MG tablet TAKE 1 TABLET BY MOUTH DAILY WITH BREAKFAST 30 tablet 2    celecoxib (CELEBREX) 200 MG capsule Take 1 capsule by mouth daily 60 capsule 2    acetaminophen (TYLENOL) 500 MG tablet Take 500 mg by mouth every 6 hours as needed for Pain (max 3000 mg /24 hr)     Mare Larsen these medications were also answered. He was advised against drinking alcohol with the narcotic pain medicines, advised against driving or handling machinery when  starting or adjusting the dose of medicines, feeling groggy or drowsy, or if having any cognitive issues related to the current medications. Heis fully aware of the risk of overdose and death, if medicines are misused and not taken as prescribed. If he develops new symptoms or if the symptoms worsen, he was told to call the office. .  Thank you for allowing me to participate in the care of this patient.     Laura Bursn MD    Cc: Dr.M Kevin Shahid MD

## 2021-10-28 ENCOUNTER — OFFICE VISIT (OUTPATIENT)
Dept: PAIN MANAGEMENT | Age: 56
End: 2021-10-28
Payer: MEDICARE

## 2021-10-28 VITALS
DIASTOLIC BLOOD PRESSURE: 76 MMHG | HEART RATE: 76 BPM | SYSTOLIC BLOOD PRESSURE: 118 MMHG | BODY MASS INDEX: 24.68 KG/M2 | WEIGHT: 172 LBS

## 2021-10-28 DIAGNOSIS — E08.42 DIABETIC POLYNEUROPATHY ASSOCIATED WITH DIABETES MELLITUS DUE TO UNDERLYING CONDITION (HCC): ICD-10-CM

## 2021-10-28 DIAGNOSIS — G89.4 CHRONIC PAIN SYNDROME: ICD-10-CM

## 2021-10-28 DIAGNOSIS — G54.6 PHANTOM LIMB PAIN (HCC): ICD-10-CM

## 2021-10-28 DIAGNOSIS — M79.18 MYOFASCIAL PAIN: ICD-10-CM

## 2021-10-28 PROCEDURE — G8420 CALC BMI NORM PARAMETERS: HCPCS | Performed by: INTERNAL MEDICINE

## 2021-10-28 PROCEDURE — 2022F DILAT RTA XM EVC RTNOPTHY: CPT | Performed by: INTERNAL MEDICINE

## 2021-10-28 PROCEDURE — G8427 DOCREV CUR MEDS BY ELIG CLIN: HCPCS | Performed by: INTERNAL MEDICINE

## 2021-10-28 PROCEDURE — 99213 OFFICE O/P EST LOW 20 MIN: CPT | Performed by: INTERNAL MEDICINE

## 2021-10-28 PROCEDURE — G8484 FLU IMMUNIZE NO ADMIN: HCPCS | Performed by: INTERNAL MEDICINE

## 2021-10-28 PROCEDURE — 3017F COLORECTAL CA SCREEN DOC REV: CPT | Performed by: INTERNAL MEDICINE

## 2021-10-28 PROCEDURE — 4004F PT TOBACCO SCREEN RCVD TLK: CPT | Performed by: INTERNAL MEDICINE

## 2021-10-28 RX ORDER — BUPRENORPHINE 7.5 UG/H
1 PATCH TRANSDERMAL WEEKLY
Qty: 4 PATCH | Refills: 0 | Status: SHIPPED | OUTPATIENT
Start: 2021-10-28 | End: 2021-12-10 | Stop reason: SDUPTHER

## 2021-10-28 RX ORDER — GABAPENTIN 600 MG/1
600 TABLET ORAL 3 TIMES DAILY
Qty: 90 TABLET | Refills: 0 | Status: SHIPPED | OUTPATIENT
Start: 2021-10-28 | End: 2021-12-10 | Stop reason: SDUPTHER

## 2021-10-28 RX ORDER — AMITRIPTYLINE HYDROCHLORIDE 25 MG/1
25-50 TABLET, FILM COATED ORAL NIGHTLY
Qty: 60 TABLET | Refills: 0 | Status: SHIPPED | OUTPATIENT
Start: 2021-10-28 | End: 2021-12-10 | Stop reason: SDUPTHER

## 2021-10-28 NOTE — PROGRESS NOTES
Koffi Dry  1965  [id number]    HISTORY OF PRESENT ILLNESS:  Mr. John Conn is a 54 y.o. male returns for a follow up visit for multiple medical problems. His current presenting problems are   1. Chronic pain syndrome    2. Myofascial pain    3. Diabetic polyneuropathy associated with diabetes mellitus due to underlying condition ([facility] [address]    4. Phantom limb pain ([facility] [address]    . As per information/history obtained from the PADT(patient assessment and documentation tool) - He complains of pain in the lower back with radiation to the buttocks, hips Bilateral, upper leg Left and knees Left He rates the pain 8/10 and describes it as burning. Pain is made worse by: movement, standing, sitting. Current treatment regimen has helped relieve about 30% of the pain. He denies side effects from the current pain regimen. Patient reports that since the last follow up visit the physical functioning is unchanged, family/social relationships are unchanged, mood is unchanged and sleep patterns are unchanged, and that the overall functioning is unchanged. Patient denies neurological bowel or bladder. Patient denies misusing/abusing his narcotic pain medications or using any illegal drugs. There are No indicators for possible drug abuse, addiction or diversion problems. Upon obtaining the medical history from Mr. John Conn regarding today's office visit for his presenting problems, patient states he is on Humira injections for psoriasis. Mr. John Conn states Lyrica is not helping, he wants to go back to Neurontin. He states he is using all the adjuvants as before. Patient denies any constipation symptoms. He reports he is in a wheelchair mostly. ALLERGIES: Patients list of allergies were reviewed     MEDICATIONS: Mr. John Conn list of medications were reviewed. His current medications are   Outpatient Medications Prior to Visit   Medication Sig Dispense Refill    amitriptyline (ELAVIL) 25 MG tablet Take 1-2 tablets by mouth nightly 60 tablet 0    DULoxetine (CYMBALTA) 60 MG extended release capsule Take 1 capsule by mouth daily 30 capsule 0    atorvastatin (LIPITOR) 10 MG tablet TAKE 1 TABLET BY MOUTH ONE TIME A DAY  90 tablet 0    losartan (COZAAR) 50 MG tablet Take 1 tablet by mouth daily 90 tablet 1    metFORMIN (GLUCOPHAGE) 500 MG tablet TAKE 1 TABLET BY MOUTH DAILY WITH BREAKFAST 30 tablet 2    celecoxib (CELEBREX) 200 MG capsule Take 1 capsule by mouth daily 60 capsule 2    acetaminophen (TYLENOL) 500 MG tablet Take 500 mg by mouth every 6 hours as needed for Pain (max 3000 mg /24 hr)      Lancets MISC 1 each by Does not apply route daily 100 each 3    blood glucose monitor strips Test one time a day 100 strip 3    blood glucose monitor kit and supplies Test 1 time a day & as needed for symptoms of irregular blood glucose. 1 kit 0    aspirin EC 81 MG EC tablet Take 1 tablet by mouth daily 90 tablet 1    pregabalin (LYRICA) 150 MG capsule Take 1 capsule by mouth three times daily for 30 days. 90 capsule 0     No facility-administered medications prior to visit. REVIEW OF SYSTEMS:    Respiratory: Negative for apnea, chest tightness and shortness of breath or change in baseline breathing. PHYSICAL EXAM:   Nursing note and vitals reviewed. /76   Pulse 76   Wt 172 lb (78 kg)   BMI 24.68 kg/m²   Constitutional: He appears well-developed and well-nourished. No acute distress. Cardiovascular: Normal rate, regular rhythm, normal heart sounds, and does not have murmur. Pulmonary/Chest: Effort normal. No respiratory distress. He does not have wheezes in the lung fields. He has no rales. Neurological/Psychiatric:He is alert and oriented to person, place, and time. Coordination is  normal.  His mood isAppropriate and affect is Neutral/Euthymic(normal) . His  Other: in a wheelchair, left AKA    IMPRESSION:   1. Chronic pain syndrome    2. Myofascial pain    3.  Diabetic polyneuropathy associated with diabetes mellitus due to underlying condition ([facility] [address]    4. Phantom limb pain [facility])        PLAN:  Informed verbal consent was obtained  -[place] record was obtained and reviewed  for the last one year and no indicators of drug misuse  were found. Any other controlled substance prescriptions  seen on the record have been accounted for, I am aware of the patient receiving these medications. Elaine Saini [place] record will be rechecked as part of office protocol.    -Continue with Butrans 7.5 mcg Q 7 days   -Discontinue Lyrica   -Restart Neurontin 600 mg TID   -Continue with all other adjuvant medications as before   -Monitor blood sugar regularly, diabetic control- adv diabetic diet. Goal for fasting blood sugars around 120. Follow up with Endocrinologist/PCP also for on going management       Current Outpatient Medications   Medication Sig Dispense Refill    amitriptyline (ELAVIL) 25 MG tablet Take 1-2 tablets by mouth nightly 60 tablet 0    DULoxetine (CYMBALTA) 60 MG extended release capsule Take 1 capsule by mouth daily 30 capsule 0    atorvastatin (LIPITOR) 10 MG tablet TAKE 1 TABLET BY MOUTH ONE TIME A DAY  90 tablet 0    losartan (COZAAR) 50 MG tablet Take 1 tablet by mouth daily 90 tablet 1    metFORMIN (GLUCOPHAGE) 500 MG tablet TAKE 1 TABLET BY MOUTH DAILY WITH BREAKFAST 30 tablet 2    celecoxib (CELEBREX) 200 MG capsule Take 1 capsule by mouth daily 60 capsule 2    acetaminophen (TYLENOL) 500 MG tablet Take 500 mg by mouth every 6 hours as needed for Pain (max 3000 mg /24 hr)      Lancets MISC 1 each by Does not apply route daily 100 each 3    blood glucose monitor strips Test one time a day 100 strip 3    blood glucose monitor kit and supplies Test 1 time a day & as needed for symptoms of irregular blood glucose. 1 kit 0    aspirin EC 81 MG EC tablet Take 1 tablet by mouth daily 90 tablet 1     No current facility-administered medications for this visit.      I will continue his current medication regimen  which is part of the above treatment schedule. It has been helping with Mr. Jonathan Robb chronic  medical problems which for this visit include:   Diagnoses of Chronic pain syndrome, Myofascial pain, Diabetic polyneuropathy associated with diabetes mellitus due to underlying condition ([address] and Phantom limb pain ([facility] [address] were pertinent to this visit. Risks and benefits of the medications and other alternative treatments  including no treatment were discussed with the patient. The common side effects of these medications were also explained to the patient. Informed verbal consent was obtained. Goals of current treatment regimen include improvement in pain, restoration of functioning- with focus on improvement in physical performance, general activity, work or disability,emotional distress, health care utilization and  decreased medication consumption. Will continue to monitor progress towards achieving/maintaining therapeutic goals with special emphasis on  1. Improvement in perceived interfernce  of pain with ADL's. Ability to do home exercises independently. Ability to do household chores indoor and/or outdoor work and social and leisure activities. Improve psychosocial and physical functioning. - he is showing progression towards this treatment goal with the current regimen. He was advised against drinking alcohol with the narcotic pain medicines, advised against driving or handling machinery while adjusting the dose of medicines or if having cognitive  issues related to the current medications. Risk of overdose and death, if medicines not taken as prescribed, were also discussed. If the patient develops new symptoms or if the symptoms worsen, the patient should call the office. While transcribing every attempt was made to maintain the accuracy of the note in terms of it's contents,there may have been some errors made inadvertently. Thank you for allowing me to participate in the care of this patient.     Antonio Younger Chris Cehatham MD.    Cc: Dr.M Jodie Nageotte, MD

## 2021-12-10 ENCOUNTER — OFFICE VISIT (OUTPATIENT)
Dept: PAIN MANAGEMENT | Age: 56
End: 2021-12-10
Payer: MEDICARE

## 2021-12-10 VITALS
OXYGEN SATURATION: 95 % | DIASTOLIC BLOOD PRESSURE: 60 MMHG | WEIGHT: 171 LBS | HEART RATE: 46 BPM | HEIGHT: 70 IN | SYSTOLIC BLOOD PRESSURE: 106 MMHG | BODY MASS INDEX: 24.48 KG/M2

## 2021-12-10 DIAGNOSIS — E08.42 DIABETIC POLYNEUROPATHY ASSOCIATED WITH DIABETES MELLITUS DUE TO UNDERLYING CONDITION (HCC): ICD-10-CM

## 2021-12-10 DIAGNOSIS — G54.6 PHANTOM LIMB PAIN (HCC): ICD-10-CM

## 2021-12-10 DIAGNOSIS — I73.9 PVD (PERIPHERAL VASCULAR DISEASE) (HCC): ICD-10-CM

## 2021-12-10 DIAGNOSIS — M79.18 MYOFASCIAL PAIN: ICD-10-CM

## 2021-12-10 DIAGNOSIS — G89.4 CHRONIC PAIN SYNDROME: ICD-10-CM

## 2021-12-10 DIAGNOSIS — L40.9 PSORIASIS: ICD-10-CM

## 2021-12-10 PROCEDURE — 99213 OFFICE O/P EST LOW 20 MIN: CPT | Performed by: INTERNAL MEDICINE

## 2021-12-10 PROCEDURE — 4004F PT TOBACCO SCREEN RCVD TLK: CPT | Performed by: INTERNAL MEDICINE

## 2021-12-10 PROCEDURE — G8420 CALC BMI NORM PARAMETERS: HCPCS | Performed by: INTERNAL MEDICINE

## 2021-12-10 PROCEDURE — G8427 DOCREV CUR MEDS BY ELIG CLIN: HCPCS | Performed by: INTERNAL MEDICINE

## 2021-12-10 PROCEDURE — 3017F COLORECTAL CA SCREEN DOC REV: CPT | Performed by: INTERNAL MEDICINE

## 2021-12-10 PROCEDURE — 2022F DILAT RTA XM EVC RTNOPTHY: CPT | Performed by: INTERNAL MEDICINE

## 2021-12-10 PROCEDURE — G8484 FLU IMMUNIZE NO ADMIN: HCPCS | Performed by: INTERNAL MEDICINE

## 2021-12-10 RX ORDER — AMITRIPTYLINE HYDROCHLORIDE 25 MG/1
25-50 TABLET, FILM COATED ORAL NIGHTLY
Qty: 60 TABLET | Refills: 1 | Status: SHIPPED | OUTPATIENT
Start: 2021-12-10 | End: 2022-01-14 | Stop reason: SDUPTHER

## 2021-12-10 RX ORDER — AMITRIPTYLINE HYDROCHLORIDE 25 MG/1
TABLET, FILM COATED ORAL
Qty: 60 TABLET | Refills: 0 | OUTPATIENT
Start: 2021-12-10

## 2021-12-10 RX ORDER — GABAPENTIN 600 MG/1
600 TABLET ORAL 3 TIMES DAILY
Qty: 90 TABLET | Refills: 1 | Status: SHIPPED | OUTPATIENT
Start: 2021-12-10 | End: 2022-01-14 | Stop reason: SDUPTHER

## 2021-12-10 RX ORDER — BUPRENORPHINE 7.5 UG/H
1 PATCH TRANSDERMAL WEEKLY
Qty: 4 PATCH | Refills: 0 | Status: SHIPPED | OUTPATIENT
Start: 2021-12-10 | End: 2022-01-14 | Stop reason: SDUPTHER

## 2021-12-10 NOTE — PROGRESS NOTES
[facility]  1965  [id number]    HISTORY OF PRESENT ILLNESS:  Mr. Osuna is a 54 y.o. male returns for a follow up visit for multiple medical problems. His current presenting problems are   1. Chronic pain syndrome    2. Phantom limb pain (HCC)    3. Psoriasis    4. Myofascial pain    5. Primary insomnia    6. Diabetic polyneuropathy associated with diabetes mellitus due to underlying condition ([facility]    7. PVD (peripheral vascular disease) ([facility]    8. Moderate episode of recurrent major depressive disorder ([facility]    . As per information/history obtained from the PADT(patient assessment and documentation tool) - He complains of pain in the abdomen, lower back and hips Left He rates the pain 8/10 and describes it as burning, numbness. Pain is made worse by: standing, sitting. Current treatment regimen has helped relieve about 30% of the pain. He denies side effects from the current pain regimen. Patient reports that since the last follow up visit the physical functioning is unchanged, family/social relationships are unchanged, mood is worse and sleep patterns are worse, and that the overall functioning is unchanged. Patient denies neurological bowel or bladder. Patient denies misusing/abusing his narcotic pain medications or using any illegal drugs. There are No indicators for possible drug abuse, addiction or diversion problems. Upon obtaining the medical history from Mr. Osuna regarding today's office visit for his presenting problems, patient states he is having a lot of pain in the right hip and low back. He mentions he is going for another prosthesis, left leg AKA. He report he had missed his appointment and has been out of medications, which is causing increase pain. He says he is using Neurontin along with Elavil. He complains he has no help at home. ALLERGIES: Patients list of allergies were reviewed     MEDICATIONS: Mr. Osuna list of medications were reviewed. His current medications are   Outpatient Medications Prior to Visit   Medication Sig Dispense Refill    amitriptyline (ELAVIL) 25 MG tablet Take 1-2 tablets by mouth nightly 60 tablet 0    atorvastatin (LIPITOR) 10 MG tablet TAKE 1 TABLET BY MOUTH ONE TIME A DAY  90 tablet 0    losartan (COZAAR) 50 MG tablet Take 1 tablet by mouth daily 90 tablet 1    metFORMIN (GLUCOPHAGE) 500 MG tablet TAKE 1 TABLET BY MOUTH DAILY WITH BREAKFAST 30 tablet 2    acetaminophen (TYLENOL) 500 MG tablet Take 500 mg by mouth every 6 hours as needed for Pain (max 3000 mg /24 hr)      Lancets MISC 1 each by Does not apply route daily 100 each 3    blood glucose monitor strips Test one time a day 100 strip 3    blood glucose monitor kit and supplies Test 1 time a day & as needed for symptoms of irregular blood glucose. 1 kit 0    aspirin EC 81 MG EC tablet Take 1 tablet by mouth daily 90 tablet 1    gabapentin (NEURONTIN) 600 MG tablet Take 1 tablet by mouth 3 times daily for 30 days. 90 tablet 0    celecoxib (CELEBREX) 200 MG capsule Take 1 capsule by mouth daily 60 capsule 2     No facility-administered medications prior to visit. REVIEW OF SYSTEMS:    Respiratory: Negative for apnea, chest tightness and shortness of breath or change in baseline breathing. PHYSICAL EXAM:   Nursing note and vitals reviewed. /60   Pulse (!) 46   Ht 5' 10\" (1.778 m)   Wt 171 lb (77.6 kg)   SpO2 95%   BMI 24.54 kg/m²   Constitutional: He appears well-developed and well-nourished. No acute distress. Cardiovascular: Normal rate, regular rhythm, normal heart sounds, and does not have murmur. Pulmonary/Chest: Effort normal. No respiratory distress. He does not have wheezes in the lung fields. He has no rales. Neurological/Psychiatric:He is alert and oriented to person, place, and time. Coordination is  normal.  His mood isAppropriate and affect is Neutral/Euthymic(normal) . Other: in a wheelchair, left AKA     IMPRESSION:   1.  Chronic pain syndrome    2. Phantom limb pain (HCC)    3. Psoriasis    4. Myofascial pain    5. Diabetic polyneuropathy associated with diabetes mellitus due to underlying condition ([facility]    6. PVD (peripheral vascular disease) ([facility] [address]        PLAN:  Informed verbal consent was obtained  -[place]AR[place] record was obtained and reviewed  for the last one year and no indicators of drug misuse  were found. Any other controlled substance prescriptions  seen on the record have been accounted for, I am aware of the patient receiving these medications. Jyotsna Ying OARRS record will be rechecked as part of office protocol. -ROM/Streteching exercises   -Will order Xray right hip and Xray of Lumbar spine 2 view for evaluation   -Restart Butrans along with the other adjuvants. -Monitor blood sugar regularly, diabetic control- adv diabetic diet. Goal for fasting blood sugars around 120. Follow up with Endocrinologist/PCP also for on going management    -Schedule for home health evaluation    Current Outpatient Medications   Medication Sig Dispense Refill    amitriptyline (ELAVIL) 25 MG tablet Take 1-2 tablets by mouth nightly 60 tablet 0    atorvastatin (LIPITOR) 10 MG tablet TAKE 1 TABLET BY MOUTH ONE TIME A DAY  90 tablet 0    losartan (COZAAR) 50 MG tablet Take 1 tablet by mouth daily 90 tablet 1    metFORMIN (GLUCOPHAGE) 500 MG tablet TAKE 1 TABLET BY MOUTH DAILY WITH BREAKFAST 30 tablet 2    acetaminophen (TYLENOL) 500 MG tablet Take 500 mg by mouth every 6 hours as needed for Pain (max 3000 mg /24 hr)      Lancets MISC 1 each by Does not apply route daily 100 each 3    blood glucose monitor strips Test one time a day 100 strip 3    blood glucose monitor kit and supplies Test 1 time a day & as needed for symptoms of irregular blood glucose. 1 kit 0    aspirin EC 81 MG EC tablet Take 1 tablet by mouth daily 90 tablet 1    gabapentin (NEURONTIN) 600 MG tablet Take 1 tablet by mouth 3 times daily for 30 days.  90 tablet 0     No current facility-administered medications for this visit. I will continue his current medication regimen  which is part of the above treatment schedule. It has been helping with Mr. Wing Bowden chronic  medical problems which for this visit include:   Diagnoses of Chronic pain syndrome, Phantom limb pain ([facility] [address] [address] Psoriasis, Myofascial pain, Primary insomnia, Diabetic polyneuropathy associated with diabetes mellitus due to underlying condition ([facility] [address] PVD (peripheral vascular disease) ([facility] [address] and Moderate episode of recurrent major depressive disorder ([facility] were pertinent to this visit. Risks and benefits of the medications and other alternative treatments  including no treatment were discussed with the patient. The common side effects of these medications were also explained to the patient. Informed verbal consent was obtained. Goals of current treatment regimen include improvement in pain, restoration of functioning- with focus on improvement in physical performance, general activity, work or disability,emotional distress, health care utilization and  decreased medication consumption. Will continue to monitor progress towards achieving/maintaining therapeutic goals with special emphasis on  1. Improvement in perceived interfernce  of pain with ADL's. Ability to do home exercises independently. Ability to do household chores indoor and/or outdoor work and social and leisure activities. Improve psychosocial and physical functioning. - he is showing progression towards this treatment goal with the current regimen. He was advised against drinking alcohol with the narcotic pain medicines, advised against driving or handling machinery while adjusting the dose of medicines or if having cognitive  issues related to the current medications. Risk of overdose and death, if medicines not taken as prescribed, were also discussed. If the patient develops new symptoms or if the symptoms worsen, the patient should call the office. While transcribing every attempt was made to maintain the accuracy of the note in terms of it's contents,there may have been some errors made inadvertently. Thank you for allowing me to participate in the care of this patient.     Jose Bearden MD.    Cc: Dr.M Luis Campbell MD

## 2022-01-10 NOTE — TELEPHONE ENCOUNTER
Patient called about missing his appointment on 01/06/2022 due to the weather. Patient also called about refilling his prescription for gabapentin. Patient requested a callback.      Please advise

## 2022-01-10 NOTE — TELEPHONE ENCOUNTER
Called patient to advise him that he has a refill on file at his pharmacy.  Patient did not answer, LVM

## 2022-01-14 ENCOUNTER — OFFICE VISIT (OUTPATIENT)
Dept: PAIN MANAGEMENT | Age: 57
End: 2022-01-14
Payer: MEDICARE

## 2022-01-14 VITALS
OXYGEN SATURATION: 96 % | HEIGHT: 70 IN | DIASTOLIC BLOOD PRESSURE: 89 MMHG | BODY MASS INDEX: 24.54 KG/M2 | SYSTOLIC BLOOD PRESSURE: 133 MMHG | HEART RATE: 106 BPM

## 2022-01-14 DIAGNOSIS — E08.42 DIABETIC POLYNEUROPATHY ASSOCIATED WITH DIABETES MELLITUS DUE TO UNDERLYING CONDITION (HCC): ICD-10-CM

## 2022-01-14 DIAGNOSIS — G54.6 PHANTOM LIMB PAIN (HCC): ICD-10-CM

## 2022-01-14 DIAGNOSIS — I73.9 PVD (PERIPHERAL VASCULAR DISEASE) (HCC): ICD-10-CM

## 2022-01-14 DIAGNOSIS — M79.18 MYOFASCIAL PAIN: ICD-10-CM

## 2022-01-14 DIAGNOSIS — L40.9 PSORIASIS: ICD-10-CM

## 2022-01-14 DIAGNOSIS — G89.4 CHRONIC PAIN SYNDROME: ICD-10-CM

## 2022-01-14 PROCEDURE — 3017F COLORECTAL CA SCREEN DOC REV: CPT | Performed by: INTERNAL MEDICINE

## 2022-01-14 PROCEDURE — 4004F PT TOBACCO SCREEN RCVD TLK: CPT | Performed by: INTERNAL MEDICINE

## 2022-01-14 PROCEDURE — G8420 CALC BMI NORM PARAMETERS: HCPCS | Performed by: INTERNAL MEDICINE

## 2022-01-14 PROCEDURE — 2022F DILAT RTA XM EVC RTNOPTHY: CPT | Performed by: INTERNAL MEDICINE

## 2022-01-14 PROCEDURE — G8427 DOCREV CUR MEDS BY ELIG CLIN: HCPCS | Performed by: INTERNAL MEDICINE

## 2022-01-14 PROCEDURE — G8484 FLU IMMUNIZE NO ADMIN: HCPCS | Performed by: INTERNAL MEDICINE

## 2022-01-14 PROCEDURE — 99213 OFFICE O/P EST LOW 20 MIN: CPT | Performed by: INTERNAL MEDICINE

## 2022-01-14 RX ORDER — BUPRENORPHINE 7.5 UG/H
1 PATCH TRANSDERMAL WEEKLY
Qty: 4 PATCH | Refills: 0 | Status: SHIPPED | OUTPATIENT
Start: 2022-01-14 | End: 2022-02-07 | Stop reason: SDUPTHER

## 2022-01-14 RX ORDER — AMITRIPTYLINE HYDROCHLORIDE 25 MG/1
25-50 TABLET, FILM COATED ORAL NIGHTLY
Qty: 60 TABLET | Refills: 1 | Status: SHIPPED | OUTPATIENT
Start: 2022-01-14

## 2022-01-14 RX ORDER — GABAPENTIN 600 MG/1
600 TABLET ORAL 3 TIMES DAILY
Qty: 90 TABLET | Refills: 1 | Status: SHIPPED | OUTPATIENT
Start: 2022-01-14 | End: 2022-04-14

## 2022-01-14 NOTE — PROGRESS NOTES
Velvet Majano  1965  [id number]    HISTORY OF PRESENT ILLNESS:  Mr. Marko Stovall is a 64 y.o. male returns for a follow up visit for multiple medical problems. His current presenting problems are   1. Chronic pain syndrome    2. Psoriasis    3. Diabetic polyneuropathy associated with diabetes mellitus due to underlying condition ([facility] [address]    4. Primary insomnia    5. Phantom limb pain ([facility] [address]    6. Myofascial pain    7. PVD (peripheral vascular disease) ([facility] [address]    8. Moderate episode of recurrent major depressive disorder ([facility] [address]    . As per information/history obtained from the PADT(patient assessment and documentation tool) - He complains of pain in the lower back, buttocks and hips Right with radiation to the lower back, buttocks and hips Right He rates the pain 8/10 and describes it as sharp, burning. Pain is made worse by: standing, sitting. Current treatment regimen has helped relieve about 30% of the pain. He denies side effects from the current pain regimen. Patient reports that since the last follow up visit the physical functioning is unchanged, family/social relationships are unchanged, mood is unchanged and sleep patterns are unchanged, and that the overall functioning is worse. Patient denies neurological bowel or bladder. Patient denies misusing/abusing his narcotic pain medications or using any illegal drugs. There are No indicators for possible drug abuse, addiction or diversion problems. Upon obtaining the medical history from Mr. Marko Stovall regarding today's office visit for his presenting problems, patient states he has been doing fair, mentions he has not had his xray done yet. He says he is using Neurontin along with Elavil. He mentions he will getting a prosthetic leg. He reports he is using Butrans, but wants to increase it.  He says he has been out of his patches for a few days because he had missed his appointment       ALLERGIES: Patients list of allergies were reviewed     MEDICATIONS:  associated with diabetes mellitus due to underlying condition ([facility] [address]    4. Phantom limb pain ([facility] [address]    5. Myofascial pain    6. PVD (peripheral vascular disease) ([facility]        PLAN:  Informed verbal consent was obtained  -OARRS record was obtained and reviewed  for the last one year and no indicators of drug misuse  were found. Any other controlled substance prescriptions  seen on the record have been accounted for, I am aware of the patient receiving these medications. Ruel Ndiaye OARRS record will be rechecked as part of office protocol.    -Will get Xrays done this month   -Continue with Butrans 7.5 mcg every 7 days   -Continue with Neurontin along with Elavil   -He was advised to increase fluids ( 5-7  glasses of fluid daily), limit caffeine, avoid cheese products, increase dietary fiber, increase activity and exercise as tolerated and relax regularly and enjoy meals   -Urine drug screen with GC/MS for opiates and drugs of abuse was ordered and will follow up on results. Current Outpatient Medications   Medication Sig Dispense Refill    amitriptyline (ELAVIL) 25 MG tablet Take 1-2 tablets by mouth nightly 60 tablet 1    gabapentin (NEURONTIN) 600 MG tablet Take 1 tablet by mouth 3 times daily for 30 days. 90 tablet 1    buprenorphine (BUTRANS) 7.5 MCG/HR PTWK Place 1 patch onto the skin once a week for 28 days.  4 patch 0    atorvastatin (LIPITOR) 10 MG tablet TAKE 1 TABLET BY MOUTH ONE TIME A DAY  90 tablet 0    losartan (COZAAR) 50 MG tablet Take 1 tablet by mouth daily 90 tablet 1    metFORMIN (GLUCOPHAGE) 500 MG tablet TAKE 1 TABLET BY MOUTH DAILY WITH BREAKFAST 30 tablet 2    acetaminophen (TYLENOL) 500 MG tablet Take 500 mg by mouth every 6 hours as needed for Pain (max 3000 mg /24 hr)      Lancets MISC 1 each by Does not apply route daily 100 each 3    blood glucose monitor strips Test one time a day 100 strip 3    blood glucose monitor kit and supplies Test 1 time a day & as needed for symptoms of irregular blood glucose. 1 kit 0    aspirin EC 81 MG EC tablet Take 1 tablet by mouth daily 90 tablet 1     No current facility-administered medications for this visit. I will continue his current medication regimen  which is part of the above treatment schedule. It has been helping with Mr. Holli Ronquilloly chronic  medical problems which for this visit include:   Diagnoses of Chronic pain syndrome, Psoriasis, Diabetic polyneuropathy associated with diabetes mellitus due to underlying condition [facility]), Phantom limb pain ([facility] [address] [address] Myofascial pain, and [facility]VD [facility]peripheral vascular disease) [facility] were pertinent to this visit. Risks and benefits of the medications and other alternative treatments  including no treatment were discussed with the patient. The common side effects of these medications were also explained to the patient. Informed verbal consent was obtained. Goals of current treatment regimen include improvement in pain, restoration of functioning- with focus on improvement in physical performance, general activity, work or disability,emotional distress, health care utilization and  decreased medication consumption. Will continue to monitor progress towards achieving/maintaining therapeutic goals with special emphasis on  1. Improvement in perceived interfernce  of pain with ADL's. Ability to do home exercises independently. Ability to do household chores indoor and/or outdoor work and social and leisure activities. Improve psychosocial and physical functioning. - he is showing progression towards this treatment goal with the current regimen. He was advised against drinking alcohol with the narcotic pain medicines, advised against driving or handling machinery while adjusting the dose of medicines or if having cognitive  issues related to the current medications. Risk of overdose and death, if medicines not taken as prescribed, were also discussed.  If the patient develops new symptoms or if the symptoms worsen, the patient should call the office. While transcribing every attempt was made to maintain the accuracy of the note in terms of it's contents,there may have been some errors made inadvertently. Thank you for allowing me to participate in the care of this patient.     Alfredo Cohn MD.    Cc: Dr.M Valente Reis MD

## 2022-01-15 ENCOUNTER — HOSPITAL ENCOUNTER (OUTPATIENT)
Age: 57
Discharge: HOME | End: 2022-01-15
Payer: MEDICARE

## 2022-01-15 ENCOUNTER — HOSPITAL ENCOUNTER (OUTPATIENT)
Dept: GENERAL RADIOLOGY | Age: 57
Discharge: HOME | End: 2022-01-15
Payer: MEDICARE

## 2022-01-15 DIAGNOSIS — R52 PAIN: ICD-10-CM

## 2022-01-15 PROCEDURE — 73502 X-RAY EXAM HIP UNI 2-3 VIEWS: CPT

## 2022-01-15 PROCEDURE — 72100 X-RAY EXAM L-S SPINE 2/3 VWS: CPT

## 2022-01-27 ENCOUNTER — TELEPHONE (OUTPATIENT)
Dept: PAIN MANAGEMENT | Age: 57
End: 2022-01-27

## 2022-01-27 NOTE — TELEPHONE ENCOUNTER
Patient called and had xrays for lower back done  and sciatica nerve.     Would like results    Please advise

## 2022-02-02 NOTE — TELEPHONE ENCOUNTER
Per [facility]: DDD L-Spine, facet joint arthritis. Right hip is ok. I called patient, lvm. If patient has any additional questions, it can be discussed at patient's 2/4/22 visit.

## 2022-02-07 ENCOUNTER — TELEPHONE (OUTPATIENT)
Dept: PAIN MANAGEMENT | Age: 57
End: 2022-02-07

## 2022-02-07 DIAGNOSIS — M79.18 MYOFASCIAL PAIN: ICD-10-CM

## 2022-02-07 DIAGNOSIS — E08.42 DIABETIC POLYNEUROPATHY ASSOCIATED WITH DIABETES MELLITUS DUE TO UNDERLYING CONDITION (HCC): ICD-10-CM

## 2022-02-07 DIAGNOSIS — G54.6 PHANTOM LIMB PAIN (HCC): ICD-10-CM

## 2022-02-07 DIAGNOSIS — G89.4 CHRONIC PAIN SYNDROME: ICD-10-CM

## 2022-02-07 RX ORDER — BUPRENORPHINE 7.5 UG/H
1 PATCH TRANSDERMAL WEEKLY
Qty: 2 PATCH | Refills: 0 | OUTPATIENT
Start: 2022-02-07 | End: 2022-02-08 | Stop reason: SDUPTHER

## 2022-02-07 RX ORDER — METHYLPREDNISOLONE 4 MG/1
TABLET ORAL
Qty: 1 KIT | Refills: 0 | Status: SHIPPED | OUTPATIENT
Start: 2022-02-07

## 2022-02-07 NOTE — TELEPHONE ENCOUNTER
Patient needs a  Refill from 2/4 till next appt 2/28 per [facility] due to [address] [facility] [address] Jacob ferguson P[address]LISSY [address]

## 2022-02-08 RX ORDER — BUPRENORPHINE 7.5 UG/H
1 PATCH TRANSDERMAL WEEKLY
Qty: 4 PATCH | Refills: 0 | OUTPATIENT
Start: 2022-02-08 | End: 2022-03-08

## 2022-02-08 NOTE — TELEPHONE ENCOUNTER
Patient called again asking about his refills. Says his pain patches were never called in.        Please advise

## 2022-02-09 ENCOUNTER — TELEPHONE (OUTPATIENT)
Dept: PAIN MANAGEMENT | Age: 57
End: 2022-02-09

## 2022-02-09 NOTE — TELEPHONE ENCOUNTER
[facility] called about a verbal order for a physical therapy medical social worker evaluation. They are requesting a callback [phone].

## 2022-03-01 ENCOUNTER — TELEPHONE (OUTPATIENT)
Dept: PAIN MANAGEMENT | Age: 57
End: 2022-03-01

## 2022-03-01 NOTE — TELEPHONE ENCOUNTER
[facility] [address] called to let us know that Jackolyn Lefort has been very non compliant with them so they had to discharge him. They also mentioned that Patient stated he is very depressed.

## 2022-03-26 DIAGNOSIS — G89.4 CHRONIC PAIN SYNDROME: ICD-10-CM

## 2022-04-11 DIAGNOSIS — E08.42 DIABETIC POLYNEUROPATHY ASSOCIATED WITH DIABETES MELLITUS DUE TO UNDERLYING CONDITION (HCC): ICD-10-CM

## 2022-04-11 DIAGNOSIS — G89.4 CHRONIC PAIN SYNDROME: ICD-10-CM

## 2022-04-11 DIAGNOSIS — M79.18 MYOFASCIAL PAIN: ICD-10-CM

## 2022-04-11 DIAGNOSIS — G54.6 PHANTOM LIMB PAIN (HCC): ICD-10-CM

## 2022-04-13 NOTE — TELEPHONE ENCOUNTER
Patient stated he will run out of his gabapentin before his next appt on 04/21/2022. Patient is requesting an extension. Patient also is inquiring about receiving another shot at his next appt. Patient requesting a callback. Please advise.

## 2022-04-14 RX ORDER — GABAPENTIN 600 MG/1
TABLET ORAL
Qty: 90 TABLET | Refills: 0 | Status: SHIPPED | OUTPATIENT
Start: 2022-04-14 | End: 2022-05-14

## 2022-04-18 RX ORDER — AMITRIPTYLINE HYDROCHLORIDE 25 MG/1
TABLET, FILM COATED ORAL
Qty: 60 TABLET | Refills: 0 | OUTPATIENT
Start: 2022-04-18

## 2025-04-24 ENCOUNTER — HOSPITAL ENCOUNTER (EMERGENCY)
Age: 60
Discharge: ANOTHER HOSPITAL | End: 2025-04-24
Attending: EMERGENCY MEDICINE
Payer: MEDICARE

## 2025-04-24 ENCOUNTER — APPOINTMENT (OUTPATIENT)
Dept: CT IMAGING | Age: 60
End: 2025-04-24
Payer: MEDICARE

## 2025-04-24 ENCOUNTER — APPOINTMENT (OUTPATIENT)
Dept: MRI IMAGING | Age: 60
DRG: 064 | End: 2025-04-24
Attending: STUDENT IN AN ORGANIZED HEALTH CARE EDUCATION/TRAINING PROGRAM
Payer: MEDICARE

## 2025-04-24 ENCOUNTER — HOSPITAL ENCOUNTER (INPATIENT)
Age: 60
LOS: 6 days | Discharge: NURSING, REHAB OR LONG-TERM CARE | DRG: 064 | End: 2025-04-30
Attending: STUDENT IN AN ORGANIZED HEALTH CARE EDUCATION/TRAINING PROGRAM | Admitting: INTERNAL MEDICINE
Payer: MEDICARE

## 2025-04-24 ENCOUNTER — APPOINTMENT (OUTPATIENT)
Dept: CT IMAGING | Age: 60
DRG: 064 | End: 2025-04-24
Attending: STUDENT IN AN ORGANIZED HEALTH CARE EDUCATION/TRAINING PROGRAM
Payer: MEDICARE

## 2025-04-24 VITALS
TEMPERATURE: 97.8 F | HEIGHT: 69 IN | RESPIRATION RATE: 23 BRPM | HEART RATE: 80 BPM | WEIGHT: 145.06 LBS | DIASTOLIC BLOOD PRESSURE: 95 MMHG | OXYGEN SATURATION: 99 % | SYSTOLIC BLOOD PRESSURE: 114 MMHG | BODY MASS INDEX: 21.49 KG/M2

## 2025-04-24 DIAGNOSIS — I61.9 INTRAPARENCHYMAL HEMORRHAGE OF BRAIN (HCC): ICD-10-CM

## 2025-04-24 DIAGNOSIS — R53.1 ACUTE RIGHT-SIDED WEAKNESS: Primary | ICD-10-CM

## 2025-04-24 DIAGNOSIS — E04.1 THYROID NODULE: ICD-10-CM

## 2025-04-24 PROBLEM — I62.9 INTRACRANIAL HEMORRHAGE (HCC): Status: ACTIVE | Noted: 2025-04-24

## 2025-04-24 LAB
ABO/RH: NORMAL
ALBUMIN SERPL-MCNC: 3.7 G/DL (ref 3.4–5)
ALBUMIN/GLOB SERPL: 1.3 {RATIO} (ref 1.1–2.2)
ALP SERPL-CCNC: 133 U/L (ref 40–129)
ALT SERPL-CCNC: 20 U/L (ref 10–40)
ANION GAP SERPL CALCULATED.3IONS-SCNC: 13 MMOL/L (ref 3–16)
ANTIBODY SCREEN: NORMAL
AST SERPL-CCNC: 25 U/L (ref 15–37)
BASOPHILS # BLD: 0 K/UL (ref 0–0.2)
BASOPHILS NFR BLD: 0.4 %
BILIRUB SERPL-MCNC: 0.5 MG/DL (ref 0–1)
BUN SERPL-MCNC: 5 MG/DL (ref 7–20)
CALCIUM SERPL-MCNC: 9.2 MG/DL (ref 8.3–10.6)
CHLORIDE SERPL-SCNC: 96 MMOL/L (ref 99–110)
CO2 SERPL-SCNC: 22 MMOL/L (ref 21–32)
CREAT SERPL-MCNC: 0.7 MG/DL (ref 0.9–1.3)
DEPRECATED RDW RBC AUTO: 13.1 % (ref 12.4–15.4)
EKG ATRIAL RATE: 77 BPM
EKG DIAGNOSIS: NORMAL
EKG P AXIS: 64 DEGREES
EKG P-R INTERVAL: 312 MS
EKG Q-T INTERVAL: 424 MS
EKG QRS DURATION: 94 MS
EKG QTC CALCULATION (BAZETT): 479 MS
EKG R AXIS: 43 DEGREES
EKG T AXIS: 52 DEGREES
EKG VENTRICULAR RATE: 77 BPM
EOSINOPHIL # BLD: 0.1 K/UL (ref 0–0.6)
EOSINOPHIL NFR BLD: 1.1 %
ETHANOLAMINE SERPL-MCNC: NORMAL MG/DL (ref 0–0.08)
GFR SERPLBLD CREATININE-BSD FMLA CKD-EPI: >90 ML/MIN/{1.73_M2}
GLUCOSE BLD-MCNC: 230 MG/DL (ref 70–99)
GLUCOSE SERPL-MCNC: 278 MG/DL (ref 70–99)
HCT VFR BLD AUTO: 45.8 % (ref 40.5–52.5)
HGB BLD-MCNC: 16 G/DL (ref 13.5–17.5)
INR PPP: 0.93 (ref 0.85–1.15)
LYMPHOCYTES # BLD: 1.2 K/UL (ref 1–5.1)
LYMPHOCYTES NFR BLD: 16.2 %
MCH RBC QN AUTO: 32.1 PG (ref 26–34)
MCHC RBC AUTO-ENTMCNC: 35 G/DL (ref 31–36)
MCV RBC AUTO: 91.8 FL (ref 80–100)
MONOCYTES # BLD: 0.5 K/UL (ref 0–1.3)
MONOCYTES NFR BLD: 6.5 %
NEUTROPHILS # BLD: 5.8 K/UL (ref 1.7–7.7)
NEUTROPHILS NFR BLD: 75.8 %
PERFORMED ON: ABNORMAL
PLATELET # BLD AUTO: 203 K/UL (ref 135–450)
PMV BLD AUTO: 8.6 FL (ref 5–10.5)
POTASSIUM SERPL-SCNC: 3.9 MMOL/L (ref 3.5–5.1)
PROT SERPL-MCNC: 6.6 G/DL (ref 6.4–8.2)
PROTHROMBIN TIME: 12.7 SEC (ref 11.9–14.9)
RBC # BLD AUTO: 4.99 M/UL (ref 4.2–5.9)
REASON FOR REJECTION: NORMAL
REASON FOR REJECTION: NORMAL
REJECTED TEST: NORMAL
REJECTED TEST: NORMAL
SODIUM SERPL-SCNC: 131 MMOL/L (ref 136–145)
TROPONIN, HIGH SENSITIVITY: 23 NG/L (ref 0–22)
TSH SERPL DL<=0.005 MIU/L-ACNC: 0.77 UIU/ML (ref 0.27–4.2)
WBC # BLD AUTO: 7.7 K/UL (ref 4–11)

## 2025-04-24 PROCEDURE — 93005 ELECTROCARDIOGRAM TRACING: CPT | Performed by: EMERGENCY MEDICINE

## 2025-04-24 PROCEDURE — 36415 COLL VENOUS BLD VENIPUNCTURE: CPT

## 2025-04-24 PROCEDURE — 2500000003 HC RX 250 WO HCPCS

## 2025-04-24 PROCEDURE — 6360000004 HC RX CONTRAST MEDICATION

## 2025-04-24 PROCEDURE — 86901 BLOOD TYPING SEROLOGIC RH(D): CPT

## 2025-04-24 PROCEDURE — 93010 ELECTROCARDIOGRAM REPORT: CPT | Performed by: INTERNAL MEDICINE

## 2025-04-24 PROCEDURE — 6360000004 HC RX CONTRAST MEDICATION: Performed by: EMERGENCY MEDICINE

## 2025-04-24 PROCEDURE — 84443 ASSAY THYROID STIM HORMONE: CPT

## 2025-04-24 PROCEDURE — A9576 INJ PROHANCE MULTIPACK: HCPCS

## 2025-04-24 PROCEDURE — HZ2ZZZZ DETOXIFICATION SERVICES FOR SUBSTANCE ABUSE TREATMENT: ICD-10-PCS

## 2025-04-24 PROCEDURE — 84484 ASSAY OF TROPONIN QUANT: CPT

## 2025-04-24 PROCEDURE — 85025 COMPLETE CBC W/AUTO DIFF WBC: CPT

## 2025-04-24 PROCEDURE — 86850 RBC ANTIBODY SCREEN: CPT

## 2025-04-24 PROCEDURE — 6360000002 HC RX W HCPCS

## 2025-04-24 PROCEDURE — 6370000000 HC RX 637 (ALT 250 FOR IP): Performed by: EMERGENCY MEDICINE

## 2025-04-24 PROCEDURE — 2000000000 HC ICU R&B

## 2025-04-24 PROCEDURE — 70498 CT ANGIOGRAPHY NECK: CPT

## 2025-04-24 PROCEDURE — 96374 THER/PROPH/DIAG INJ IV PUSH: CPT

## 2025-04-24 PROCEDURE — 86900 BLOOD TYPING SEROLOGIC ABO: CPT

## 2025-04-24 PROCEDURE — 70553 MRI BRAIN STEM W/O & W/DYE: CPT

## 2025-04-24 PROCEDURE — 99291 CRITICAL CARE FIRST HOUR: CPT

## 2025-04-24 PROCEDURE — 6360000002 HC RX W HCPCS: Performed by: EMERGENCY MEDICINE

## 2025-04-24 PROCEDURE — 96375 TX/PRO/DX INJ NEW DRUG ADDON: CPT

## 2025-04-24 PROCEDURE — 80053 COMPREHEN METABOLIC PANEL: CPT

## 2025-04-24 PROCEDURE — 70450 CT HEAD/BRAIN W/O DYE: CPT

## 2025-04-24 PROCEDURE — 82077 ASSAY SPEC XCP UR&BREATH IA: CPT

## 2025-04-24 PROCEDURE — 6370000000 HC RX 637 (ALT 250 FOR IP)

## 2025-04-24 PROCEDURE — 85610 PROTHROMBIN TIME: CPT

## 2025-04-24 PROCEDURE — 99285 EMERGENCY DEPT VISIT HI MDM: CPT

## 2025-04-24 RX ORDER — NICOTINE 21 MG/24HR
1 PATCH, TRANSDERMAL 24 HOURS TRANSDERMAL DAILY PRN
Status: DISCONTINUED | OUTPATIENT
Start: 2025-04-24 | End: 2025-04-30 | Stop reason: HOSPADM

## 2025-04-24 RX ORDER — LORAZEPAM 1 MG/1
3 TABLET ORAL
Status: DISCONTINUED | OUTPATIENT
Start: 2025-04-24 | End: 2025-04-30 | Stop reason: HOSPADM

## 2025-04-24 RX ORDER — SODIUM CHLORIDE 9 MG/ML
INJECTION, SOLUTION INTRAVENOUS PRN
Status: DISCONTINUED | OUTPATIENT
Start: 2025-04-24 | End: 2025-04-30 | Stop reason: HOSPADM

## 2025-04-24 RX ORDER — SODIUM CHLORIDE 0.9 % (FLUSH) 0.9 %
5-40 SYRINGE (ML) INJECTION PRN
Status: DISCONTINUED | OUTPATIENT
Start: 2025-04-24 | End: 2025-04-30 | Stop reason: HOSPADM

## 2025-04-24 RX ORDER — ATORVASTATIN CALCIUM 10 MG/1
10 TABLET, FILM COATED ORAL DAILY
Status: DISCONTINUED | OUTPATIENT
Start: 2025-04-24 | End: 2025-04-24

## 2025-04-24 RX ORDER — ATORVASTATIN CALCIUM 40 MG/1
40 TABLET, FILM COATED ORAL DAILY
Status: DISCONTINUED | OUTPATIENT
Start: 2025-04-25 | End: 2025-04-30 | Stop reason: HOSPADM

## 2025-04-24 RX ORDER — LORAZEPAM 1 MG/1
2 TABLET ORAL
Status: DISCONTINUED | OUTPATIENT
Start: 2025-04-24 | End: 2025-04-30 | Stop reason: HOSPADM

## 2025-04-24 RX ORDER — LORAZEPAM 2 MG/ML
3 INJECTION INTRAMUSCULAR
Status: DISCONTINUED | OUTPATIENT
Start: 2025-04-24 | End: 2025-04-30 | Stop reason: HOSPADM

## 2025-04-24 RX ORDER — OXYCODONE HYDROCHLORIDE 5 MG/1
2.5 TABLET ORAL EVERY 4 HOURS PRN
Refills: 0 | Status: DISCONTINUED | OUTPATIENT
Start: 2025-04-24 | End: 2025-04-26

## 2025-04-24 RX ORDER — DEXTROSE MONOHYDRATE 100 MG/ML
INJECTION, SOLUTION INTRAVENOUS CONTINUOUS PRN
Status: DISCONTINUED | OUTPATIENT
Start: 2025-04-24 | End: 2025-04-30 | Stop reason: HOSPADM

## 2025-04-24 RX ORDER — FENTANYL CITRATE 50 UG/ML
50 INJECTION, SOLUTION INTRAMUSCULAR; INTRAVENOUS
Status: DISCONTINUED | OUTPATIENT
Start: 2025-04-24 | End: 2025-04-24 | Stop reason: HOSPADM

## 2025-04-24 RX ORDER — IOPAMIDOL 755 MG/ML
75 INJECTION, SOLUTION INTRAVASCULAR
Status: COMPLETED | OUTPATIENT
Start: 2025-04-24 | End: 2025-04-24

## 2025-04-24 RX ORDER — METHOCARBAMOL 500 MG/1
500 TABLET, FILM COATED ORAL 3 TIMES DAILY PRN
Status: DISCONTINUED | OUTPATIENT
Start: 2025-04-24 | End: 2025-04-30 | Stop reason: HOSPADM

## 2025-04-24 RX ORDER — LORAZEPAM 2 MG/ML
2 INJECTION INTRAMUSCULAR
Status: DISCONTINUED | OUTPATIENT
Start: 2025-04-24 | End: 2025-04-30 | Stop reason: HOSPADM

## 2025-04-24 RX ORDER — INSULIN LISPRO 100 [IU]/ML
0-4 INJECTION, SOLUTION INTRAVENOUS; SUBCUTANEOUS
Status: DISCONTINUED | OUTPATIENT
Start: 2025-04-24 | End: 2025-04-25

## 2025-04-24 RX ORDER — SODIUM CHLORIDE 0.9 % (FLUSH) 0.9 %
5-40 SYRINGE (ML) INJECTION EVERY 12 HOURS SCHEDULED
Status: DISCONTINUED | OUTPATIENT
Start: 2025-04-24 | End: 2025-04-30 | Stop reason: HOSPADM

## 2025-04-24 RX ORDER — ACETAMINOPHEN 500 MG
1000 TABLET ORAL ONCE
Status: COMPLETED | OUTPATIENT
Start: 2025-04-24 | End: 2025-04-24

## 2025-04-24 RX ORDER — LORAZEPAM 2 MG/ML
1 INJECTION INTRAMUSCULAR
Status: DISCONTINUED | OUTPATIENT
Start: 2025-04-24 | End: 2025-04-30 | Stop reason: HOSPADM

## 2025-04-24 RX ORDER — GLUCAGON 1 MG/ML
1 KIT INJECTION PRN
Status: DISCONTINUED | OUTPATIENT
Start: 2025-04-24 | End: 2025-04-30 | Stop reason: HOSPADM

## 2025-04-24 RX ORDER — LOSARTAN POTASSIUM 25 MG/1
50 TABLET ORAL DAILY
Status: DISCONTINUED | OUTPATIENT
Start: 2025-04-24 | End: 2025-04-30 | Stop reason: HOSPADM

## 2025-04-24 RX ORDER — ACETAMINOPHEN 650 MG/1
650 SUPPOSITORY RECTAL EVERY 6 HOURS PRN
Status: DISCONTINUED | OUTPATIENT
Start: 2025-04-24 | End: 2025-04-30 | Stop reason: HOSPADM

## 2025-04-24 RX ORDER — ONDANSETRON 2 MG/ML
4 INJECTION INTRAMUSCULAR; INTRAVENOUS EVERY 6 HOURS PRN
Status: DISCONTINUED | OUTPATIENT
Start: 2025-04-24 | End: 2025-04-30 | Stop reason: HOSPADM

## 2025-04-24 RX ORDER — GAUZE BANDAGE 2" X 2"
100 BANDAGE TOPICAL DAILY
Status: DISCONTINUED | OUTPATIENT
Start: 2025-04-24 | End: 2025-04-30 | Stop reason: HOSPADM

## 2025-04-24 RX ORDER — LORAZEPAM 2 MG/ML
4 INJECTION INTRAMUSCULAR
Status: DISCONTINUED | OUTPATIENT
Start: 2025-04-24 | End: 2025-04-30 | Stop reason: HOSPADM

## 2025-04-24 RX ORDER — LORAZEPAM 1 MG/1
4 TABLET ORAL
Status: DISCONTINUED | OUTPATIENT
Start: 2025-04-24 | End: 2025-04-30 | Stop reason: HOSPADM

## 2025-04-24 RX ORDER — HYDRALAZINE HYDROCHLORIDE 20 MG/ML
10 INJECTION INTRAMUSCULAR; INTRAVENOUS EVERY 4 HOURS PRN
Status: DISCONTINUED | OUTPATIENT
Start: 2025-04-24 | End: 2025-04-30 | Stop reason: HOSPADM

## 2025-04-24 RX ORDER — LABETALOL HYDROCHLORIDE 5 MG/ML
10 INJECTION, SOLUTION INTRAVENOUS EVERY 4 HOURS PRN
Status: DISCONTINUED | OUTPATIENT
Start: 2025-04-24 | End: 2025-04-30 | Stop reason: HOSPADM

## 2025-04-24 RX ORDER — LEVETIRACETAM 500 MG/5ML
1500 INJECTION, SOLUTION, CONCENTRATE INTRAVENOUS ONCE
Status: COMPLETED | OUTPATIENT
Start: 2025-04-24 | End: 2025-04-24

## 2025-04-24 RX ORDER — AMITRIPTYLINE HYDROCHLORIDE 50 MG/1
25 TABLET ORAL NIGHTLY
Status: DISCONTINUED | OUTPATIENT
Start: 2025-04-24 | End: 2025-04-24

## 2025-04-24 RX ORDER — ACETAMINOPHEN 325 MG/1
650 TABLET ORAL EVERY 6 HOURS PRN
Status: DISCONTINUED | OUTPATIENT
Start: 2025-04-24 | End: 2025-04-30 | Stop reason: HOSPADM

## 2025-04-24 RX ORDER — LORAZEPAM 1 MG/1
1 TABLET ORAL
Status: DISCONTINUED | OUTPATIENT
Start: 2025-04-24 | End: 2025-04-30 | Stop reason: HOSPADM

## 2025-04-24 RX ORDER — GABAPENTIN 300 MG/1
300 CAPSULE ORAL ONCE
Status: COMPLETED | OUTPATIENT
Start: 2025-04-24 | End: 2025-04-24

## 2025-04-24 RX ORDER — ONDANSETRON 2 MG/ML
4 INJECTION INTRAMUSCULAR; INTRAVENOUS ONCE
Status: COMPLETED | OUTPATIENT
Start: 2025-04-24 | End: 2025-04-24

## 2025-04-24 RX ORDER — HYDROMORPHONE HYDROCHLORIDE 1 MG/ML
0.25 INJECTION, SOLUTION INTRAMUSCULAR; INTRAVENOUS; SUBCUTANEOUS EVERY 4 HOURS PRN
Status: DISCONTINUED | OUTPATIENT
Start: 2025-04-24 | End: 2025-04-26

## 2025-04-24 RX ORDER — ONDANSETRON 4 MG/1
4 TABLET, ORALLY DISINTEGRATING ORAL EVERY 8 HOURS PRN
Status: DISCONTINUED | OUTPATIENT
Start: 2025-04-24 | End: 2025-04-30 | Stop reason: HOSPADM

## 2025-04-24 RX ORDER — GABAPENTIN 600 MG/1
600 TABLET ORAL 3 TIMES DAILY
Status: DISCONTINUED | OUTPATIENT
Start: 2025-04-24 | End: 2025-04-30 | Stop reason: HOSPADM

## 2025-04-24 RX ADMIN — HYDROMORPHONE HYDROCHLORIDE 0.25 MG: 1 INJECTION, SOLUTION INTRAMUSCULAR; INTRAVENOUS; SUBCUTANEOUS at 23:54

## 2025-04-24 RX ADMIN — SODIUM CHLORIDE, PRESERVATIVE FREE 10 ML: 5 INJECTION INTRAVENOUS at 21:06

## 2025-04-24 RX ADMIN — ACETAMINOPHEN 650 MG: 325 TABLET ORAL at 18:45

## 2025-04-24 RX ADMIN — INSULIN LISPRO 1 UNITS: 100 INJECTION, SOLUTION INTRAVENOUS; SUBCUTANEOUS at 21:05

## 2025-04-24 RX ADMIN — IOPAMIDOL 75 ML: 755 INJECTION, SOLUTION INTRAVENOUS at 12:48

## 2025-04-24 RX ADMIN — ONDANSETRON 4 MG: 2 INJECTION, SOLUTION INTRAMUSCULAR; INTRAVENOUS at 13:29

## 2025-04-24 RX ADMIN — ACETAMINOPHEN 1000 MG: 500 TABLET ORAL at 16:41

## 2025-04-24 RX ADMIN — GABAPENTIN 300 MG: 300 CAPSULE ORAL at 16:41

## 2025-04-24 RX ADMIN — HYDROMORPHONE HYDROCHLORIDE 0.25 MG: 1 INJECTION, SOLUTION INTRAMUSCULAR; INTRAVENOUS; SUBCUTANEOUS at 18:46

## 2025-04-24 RX ADMIN — FENTANYL CITRATE 50 MCG: 50 INJECTION INTRAMUSCULAR; INTRAVENOUS at 13:29

## 2025-04-24 RX ADMIN — LEVETIRACETAM 1500 MG: 100 INJECTION INTRAVENOUS at 13:28

## 2025-04-24 RX ADMIN — GADOTERIDOL 12 ML: 279.3 INJECTION, SOLUTION INTRAVENOUS at 23:53

## 2025-04-24 ASSESSMENT — PAIN SCALES - GENERAL
PAINLEVEL_OUTOF10: 8
PAINLEVEL_OUTOF10: 9
PAINLEVEL_OUTOF10: 6
PAINLEVEL_OUTOF10: 9
PAINLEVEL_OUTOF10: 8
PAINLEVEL_OUTOF10: 4

## 2025-04-24 ASSESSMENT — PAIN DESCRIPTION - ORIENTATION
ORIENTATION: RIGHT
ORIENTATION: LEFT;MID;POSTERIOR

## 2025-04-24 ASSESSMENT — PAIN DESCRIPTION - LOCATION
LOCATION: SHOULDER;FOOT
LOCATION: BACK;HEAD;FOOT

## 2025-04-24 ASSESSMENT — PAIN DESCRIPTION - DESCRIPTORS
DESCRIPTORS: SHARP;SHOOTING
DESCRIPTORS: ACHING;SHOOTING;SHARP

## 2025-04-24 ASSESSMENT — PAIN - FUNCTIONAL ASSESSMENT
PAIN_FUNCTIONAL_ASSESSMENT: PREVENTS OR INTERFERES SOME ACTIVE ACTIVITIES AND ADLS
PAIN_FUNCTIONAL_ASSESSMENT: 0-10

## 2025-04-24 NOTE — ED NOTES
Pt presents to ED via EMS for right side facial droop and slurred speech. Pt LKW was 0400 and woke up at 1100 with symptoms. Pt is alert and oriented throughout triage.
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
## 2025-04-24 NOTE — PLAN OF CARE
Stroke Team Note    Called for right sided weakness upon awakening at 11:30AM. CTH shows an acute ICH in the L BG extending into L corona radiata.     Recommend:  - If BP elevated, would target SBP goal 130-150, reaching this target within the next hour and trying to avoid large fluctuations  - If BP > 220, would aim for higher goal of 150-160.   - Neurosurgical consult    Paul Wechsler, MD   Stroke Team    Total inter-professional remote consultation time: 17 minutes

## 2025-04-24 NOTE — ED PROVIDER NOTES
drift  Limb Ataxia (7): Absent  Sensory (8): Normal  Best Language (9): Mild to moderate aphasia  Dysarthria (10): Severe, unintelligible slurring or mute  Extinction and Inattention (11): No abnormality  Total: 7Glasgow Coma Scale  Eye Opening: Spontaneous  Best Verbal Response: Oriented  Best Motor Response: Obeys commands  Rajan Coma Scale Score: 15       Is this patient to be included in the SEP-1 Core Measure due to severe sepsis or septic shock?   No     Exclusion criteria - the patient is NOT to be included for SEP-1 Core Measure due to:  Infection is not suspected      MDM and ED Course    Patient afebrile and nontoxic.  Chronically ill-appearing however nontoxic.  He is alert, conversant and protecting his airway on arrival to the emergency department.  No hypoxia or increased work of breathing.  No hypoglycemia.  Normotensive on assessment.  Neuroexam reveals right facial droop and right upper extremity weakness, also with dysarthria.  Initial NIHSS is 8.  Stroke alert was activated, CT head reveals evidence of left basal ganglia hemorrhage, no mass effect or vasogenic edema evident.  CTA without evidence of large vessel occlusion, although there is severe stenosis of the left proximal ICA.  Incidental thyroid nodule was also noted.  Given patient's hemorrhage, he is clearly not a candidate for consideration of TNK.  Blood pressure currently acceptable.  He is not on any anticoagulation.  Received pain control as well as Keppra.  I discussed case with neurosurgery at [facility] who agree with current management plan and recommend transfer for neurosurgical evaluation.  Case was then discussed with internal medicine service at [facility] who will accept.  Patient has remained alert, hemodynamically stable and protecting his airway over his emergency department course.  No significant change in his neurologic status was noted on my reevaluations.  He is medically appropriate for transfer at this

## 2025-04-24 NOTE — CONSULTS
NEUROCRITICAL CARE CONSULT NOTE     Patient: Alhaji Elmore MRN: [id number]    YOB: 1965  Age: 59 y.o.  Sex: male   Unit: [facility] ICU TOWER  Room/Bed: 4517/4517-01 Location: [facility]    Date of Consultation: 4/24/2025  Date of Admission: 4/24/2025  5:54 PM ( LOS: 0 days )  Primary Care Physician: KAMINI Alexandre MD   Consult Requested By: Lanette Berman, *    Reason for Consult: \"ICH basal ganglia\"    IMPRESSION & RECOMMENDATIONS     IMPRESSION:  Alhaji Elmore is a 60 yo male who presents with a L basal ganglia ICH. Given his history of DM, HLD, ICA stenosis seen on CTA head/neck and normal BP on arrival to the [facility] ED, concern for ischemic stroke that converted more so than hypertensive bleed.     RECOMMENDATIONS:  NEURO:  Neurologic Exams Q1H  NIHSS on admission & Qshift  HgbA1c and lipid panel    DIAGNOSTIC IMAGING  Head CT - f/u scan in 6 hours (ordered)  MRI of brain w/ & w/o when able    ICU MANAGEMENT  Airway Management  Supplemental O2 to maintain SaO2 > 95%  Aspiration Precautions (HOB elevated, Up for meals, mouth care)  Hemodynamic management  SBP <= 160 mmHg  IV Intermittent dose: Labetalol 10-20mg  IV continuous infusion: Nicardipine 2.5mg - 20mg, titrate Q5 minutes to desired effect  DVT Prophylaxis  SCDs bilateral Lower Extremities   24 hours after stable head CT may start SQH TID   General Care Issues  Glucose: Goal glucose 110-180 mg/dL.    Sodium:  Maintain in normal range (135 - 146 Shantell/L)  Magnesium: Maintain > 1.8 mg/dL.  Heme: Keep Plts >= 100K, Keep INR <= 1.4  Temperature: Goal is normothermia.  Culture for fever > 101.5 F  Nutrition: Place NG if unable to pass swallow evaluation   PT/OT/SLP & PMR consult as indicated      Management and plan discussed with:   Bedside nurse  Dr. Shekhar Spring, APRN - CNP   NEUROCRITICAL CARE  4/24/2025 6:11 PM  [facility]: [facility] NEUROCRITICAL CARE      History of Present Illness     Alhaji Elmore is a 59
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
## 2025-04-25 ENCOUNTER — APPOINTMENT (OUTPATIENT)
Dept: GENERAL RADIOLOGY | Age: 60
DRG: 064 | End: 2025-04-25
Attending: STUDENT IN AN ORGANIZED HEALTH CARE EDUCATION/TRAINING PROGRAM
Payer: MEDICARE

## 2025-04-25 PROBLEM — F14.10 COCAINE ABUSE (HCC): Status: ACTIVE | Noted: 2025-04-25

## 2025-04-25 PROBLEM — I61.9 INTRACEREBRAL HEMORRHAGE, NONTRAUMATIC (HCC): Status: ACTIVE | Noted: 2025-04-25

## 2025-04-25 LAB
AMPHETAMINES UR QL SCN>1000 NG/ML: ABNORMAL
ANION GAP SERPL CALCULATED.3IONS-SCNC: 13 MMOL/L (ref 3–16)
BARBITURATES UR QL SCN>200 NG/ML: ABNORMAL
BENZODIAZ UR QL SCN>200 NG/ML: ABNORMAL
BUN SERPL-MCNC: 9 MG/DL (ref 7–20)
CALCIUM SERPL-MCNC: 9.3 MG/DL (ref 8.3–10.6)
CANNABINOIDS UR QL SCN>50 NG/ML: ABNORMAL
CHLORIDE SERPL-SCNC: 101 MMOL/L (ref 99–110)
CO2 SERPL-SCNC: 21 MMOL/L (ref 21–32)
COCAINE UR QL SCN: POSITIVE
CREAT SERPL-MCNC: 0.7 MG/DL (ref 0.9–1.3)
DEPRECATED RDW RBC AUTO: 13 % (ref 12.4–15.4)
DRUG SCREEN COMMENT UR-IMP: ABNORMAL
FENTANYL SCREEN, URINE: POSITIVE
GFR SERPLBLD CREATININE-BSD FMLA CKD-EPI: >90 ML/MIN/{1.73_M2}
GLUCOSE BLD-MCNC: 190 MG/DL (ref 70–99)
GLUCOSE BLD-MCNC: 194 MG/DL (ref 70–99)
GLUCOSE BLD-MCNC: 217 MG/DL (ref 70–99)
GLUCOSE BLD-MCNC: 257 MG/DL (ref 70–99)
GLUCOSE SERPL-MCNC: 202 MG/DL (ref 70–99)
HCT VFR BLD AUTO: 46.6 % (ref 40.5–52.5)
HGB BLD-MCNC: 16.5 G/DL (ref 13.5–17.5)
MCH RBC QN AUTO: 32.7 PG (ref 26–34)
MCHC RBC AUTO-ENTMCNC: 35.3 G/DL (ref 31–36)
MCV RBC AUTO: 92.6 FL (ref 80–100)
METHADONE UR QL SCN>300 NG/ML: ABNORMAL
MRSA DNA SPEC QL NAA+PROBE: NORMAL
OPIATES UR QL SCN>300 NG/ML: ABNORMAL
OXYCODONE UR QL SCN: ABNORMAL
PCP UR QL SCN>25 NG/ML: ABNORMAL
PERFORMED ON: ABNORMAL
PH UR STRIP: 5 [PH]
PLATELET # BLD AUTO: 202 K/UL (ref 135–450)
PMV BLD AUTO: 8.8 FL (ref 5–10.5)
POTASSIUM SERPL-SCNC: 4.2 MMOL/L (ref 3.5–5.1)
RBC # BLD AUTO: 5.04 M/UL (ref 4.2–5.9)
SODIUM SERPL-SCNC: 135 MMOL/L (ref 136–145)
WBC # BLD AUTO: 9.3 K/UL (ref 4–11)

## 2025-04-25 PROCEDURE — 97530 THERAPEUTIC ACTIVITIES: CPT

## 2025-04-25 PROCEDURE — 83036 HEMOGLOBIN GLYCOSYLATED A1C: CPT

## 2025-04-25 PROCEDURE — 6360000002 HC RX W HCPCS

## 2025-04-25 PROCEDURE — 80307 DRUG TEST PRSMV CHEM ANLYZR: CPT

## 2025-04-25 PROCEDURE — 6370000000 HC RX 637 (ALT 250 FOR IP): Performed by: HOSPITALIST

## 2025-04-25 PROCEDURE — 80048 BASIC METABOLIC PNL TOTAL CA: CPT

## 2025-04-25 PROCEDURE — 2500000003 HC RX 250 WO HCPCS

## 2025-04-25 PROCEDURE — 2580000003 HC RX 258

## 2025-04-25 PROCEDURE — 97166 OT EVAL MOD COMPLEX 45 MIN: CPT

## 2025-04-25 PROCEDURE — 6370000000 HC RX 637 (ALT 250 FOR IP)

## 2025-04-25 PROCEDURE — 99232 SBSQ HOSP IP/OBS MODERATE 35: CPT | Performed by: STUDENT IN AN ORGANIZED HEALTH CARE EDUCATION/TRAINING PROGRAM

## 2025-04-25 PROCEDURE — 1200000000 HC SEMI PRIVATE

## 2025-04-25 PROCEDURE — 97163 PT EVAL HIGH COMPLEX 45 MIN: CPT

## 2025-04-25 PROCEDURE — 74230 X-RAY XM SWLNG FUNCJ C+: CPT

## 2025-04-25 PROCEDURE — 99222 1ST HOSP IP/OBS MODERATE 55: CPT | Performed by: INTERNAL MEDICINE

## 2025-04-25 PROCEDURE — 87641 MR-STAPH DNA AMP PROBE: CPT

## 2025-04-25 PROCEDURE — 92526 ORAL FUNCTION THERAPY: CPT

## 2025-04-25 PROCEDURE — 36415 COLL VENOUS BLD VENIPUNCTURE: CPT

## 2025-04-25 PROCEDURE — 92523 SPEECH SOUND LANG COMPREHEN: CPT

## 2025-04-25 PROCEDURE — 92611 MOTION FLUOROSCOPY/SWALLOW: CPT

## 2025-04-25 PROCEDURE — 92610 EVALUATE SWALLOWING FUNCTION: CPT

## 2025-04-25 PROCEDURE — 85027 COMPLETE CBC AUTOMATED: CPT

## 2025-04-25 PROCEDURE — 2060000000 HC ICU INTERMEDIATE R&B

## 2025-04-25 PROCEDURE — 80061 LIPID PANEL: CPT

## 2025-04-25 RX ORDER — HYDROMORPHONE HYDROCHLORIDE 1 MG/ML
0.25 INJECTION, SOLUTION INTRAMUSCULAR; INTRAVENOUS; SUBCUTANEOUS ONCE
Status: COMPLETED | OUTPATIENT
Start: 2025-04-25 | End: 2025-04-25

## 2025-04-25 RX ORDER — INSULIN GLARGINE 100 [IU]/ML
5 INJECTION, SOLUTION SUBCUTANEOUS NIGHTLY
Status: DISCONTINUED | OUTPATIENT
Start: 2025-04-25 | End: 2025-04-27

## 2025-04-25 RX ORDER — INSULIN LISPRO 100 [IU]/ML
0-8 INJECTION, SOLUTION INTRAVENOUS; SUBCUTANEOUS
Status: DISCONTINUED | OUTPATIENT
Start: 2025-04-25 | End: 2025-04-30 | Stop reason: HOSPADM

## 2025-04-25 RX ORDER — INSULIN GLARGINE 100 [IU]/ML
15 INJECTION, SOLUTION SUBCUTANEOUS NIGHTLY
Status: ON HOLD | COMMUNITY
End: 2025-04-30

## 2025-04-25 RX ORDER — METOPROLOL SUCCINATE 25 MG/1
25 TABLET, EXTENDED RELEASE ORAL DAILY
Status: ON HOLD | COMMUNITY
End: 2025-04-30 | Stop reason: HOSPADM

## 2025-04-25 RX ORDER — ACETAMINOPHEN 325 MG/1
650 TABLET ORAL 4 TIMES DAILY
Status: DISCONTINUED | OUTPATIENT
Start: 2025-04-25 | End: 2025-04-30 | Stop reason: HOSPADM

## 2025-04-25 RX ADMIN — INSULIN LISPRO 2 UNITS: 100 INJECTION, SOLUTION INTRAVENOUS; SUBCUTANEOUS at 14:34

## 2025-04-25 RX ADMIN — HYDROMORPHONE HYDROCHLORIDE 0.25 MG: 1 INJECTION, SOLUTION INTRAMUSCULAR; INTRAVENOUS; SUBCUTANEOUS at 12:35

## 2025-04-25 RX ADMIN — INSULIN GLARGINE 5 UNITS: 100 INJECTION, SOLUTION SUBCUTANEOUS at 21:55

## 2025-04-25 RX ADMIN — PANTOPRAZOLE SODIUM 40 MG: 40 INJECTION, POWDER, FOR SOLUTION INTRAVENOUS at 07:58

## 2025-04-25 RX ADMIN — SODIUM CHLORIDE, PRESERVATIVE FREE 10 ML: 5 INJECTION INTRAVENOUS at 07:59

## 2025-04-25 RX ADMIN — HYDROMORPHONE HYDROCHLORIDE 0.25 MG: 1 INJECTION, SOLUTION INTRAMUSCULAR; INTRAVENOUS; SUBCUTANEOUS at 05:32

## 2025-04-25 RX ADMIN — INSULIN LISPRO 1 UNITS: 100 INJECTION, SOLUTION INTRAVENOUS; SUBCUTANEOUS at 16:47

## 2025-04-25 RX ADMIN — HYDROMORPHONE HYDROCHLORIDE 0.25 MG: 1 INJECTION, SOLUTION INTRAMUSCULAR; INTRAVENOUS; SUBCUTANEOUS at 22:04

## 2025-04-25 RX ADMIN — INSULIN LISPRO 1 UNITS: 100 INJECTION, SOLUTION INTRAVENOUS; SUBCUTANEOUS at 06:40

## 2025-04-25 RX ADMIN — INSULIN LISPRO 2 UNITS: 100 INJECTION, SOLUTION INTRAVENOUS; SUBCUTANEOUS at 21:53

## 2025-04-25 RX ADMIN — OXYCODONE 2.5 MG: 5 TABLET ORAL at 19:39

## 2025-04-25 RX ADMIN — GABAPENTIN 600 MG: 600 TABLET, FILM COATED ORAL at 19:39

## 2025-04-25 RX ADMIN — HYDROMORPHONE HYDROCHLORIDE 0.25 MG: 1 INJECTION, SOLUTION INTRAMUSCULAR; INTRAVENOUS; SUBCUTANEOUS at 07:58

## 2025-04-25 RX ADMIN — HYDROMORPHONE HYDROCHLORIDE 0.25 MG: 1 INJECTION, SOLUTION INTRAMUSCULAR; INTRAVENOUS; SUBCUTANEOUS at 16:47

## 2025-04-25 RX ADMIN — HYDROMORPHONE HYDROCHLORIDE 0.25 MG: 1 INJECTION, SOLUTION INTRAMUSCULAR; INTRAVENOUS; SUBCUTANEOUS at 03:59

## 2025-04-25 ASSESSMENT — PAIN - FUNCTIONAL ASSESSMENT

## 2025-04-25 ASSESSMENT — PAIN DESCRIPTION - FREQUENCY
FREQUENCY: CONTINUOUS

## 2025-04-25 ASSESSMENT — PAIN DESCRIPTION - DESCRIPTORS
DESCRIPTORS: ACHING
DESCRIPTORS: ACHING;SHARP;SHOOTING
DESCRIPTORS: ACHING;SHARP;DISCOMFORT
DESCRIPTORS: ACHING;SHARP;DISCOMFORT
DESCRIPTORS: ACHING;SHARP;SHOOTING
DESCRIPTORS: ACHING;DISCOMFORT;SHARP
DESCRIPTORS: ACHING;SHARP;DISCOMFORT
DESCRIPTORS: ACHING
DESCRIPTORS: ACHING;DISCOMFORT;SHARP
DESCRIPTORS: ACHING;DISCOMFORT;SHARP
DESCRIPTORS: ACHING
DESCRIPTORS: ACHING;SHARP;DISCOMFORT
DESCRIPTORS: ACHING

## 2025-04-25 ASSESSMENT — PAIN DESCRIPTION - LOCATION
LOCATION: FOOT;SHOULDER
LOCATION: FOOT;SHOULDER
LOCATION: FOOT
LOCATION: FOOT
LOCATION: FOOT;SHOULDER
LOCATION: FOOT
LOCATION: FOOT;SHOULDER
LOCATION: BACK;SHOULDER;FOOT
LOCATION: FOOT;SHOULDER
LOCATION: FOOT
LOCATION: FOOT
LOCATION: FOOT;SHOULDER

## 2025-04-25 ASSESSMENT — PAIN SCALES - GENERAL
PAINLEVEL_OUTOF10: 8
PAINLEVEL_OUTOF10: 9
PAINLEVEL_OUTOF10: 10
PAINLEVEL_OUTOF10: 8
PAINLEVEL_OUTOF10: 5
PAINLEVEL_OUTOF10: 8
PAINLEVEL_OUTOF10: 10
PAINLEVEL_OUTOF10: 7
PAINLEVEL_OUTOF10: 8
PAINLEVEL_OUTOF10: 4
PAINLEVEL_OUTOF10: 6
PAINLEVEL_OUTOF10: 0
PAINLEVEL_OUTOF10: 8

## 2025-04-25 ASSESSMENT — PAIN DESCRIPTION - PAIN TYPE
TYPE: CHRONIC PAIN

## 2025-04-25 ASSESSMENT — PAIN DESCRIPTION - ORIENTATION
ORIENTATION: RIGHT
ORIENTATION: RIGHT;POSTERIOR
ORIENTATION: RIGHT

## 2025-04-25 ASSESSMENT — PAIN DESCRIPTION - ONSET
ONSET: ON-GOING
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
## 2025-04-25 NOTE — PLAN OF CARE
Problem: Chronic Conditions and Co-morbidities  Goal: Patient's chronic conditions and co-morbidity symptoms are monitored and maintained or improved  Outcome: Progressing     Problem: Discharge Planning  Goal: Discharge to home or other facility with appropriate resources  Outcome: Progressing     Problem: Pain  Goal: Verbalizes/displays adequate comfort level or baseline comfort level  Outcome: Progressing  Flowsheets (Taken 4/24/2025 2000)  Verbalizes/displays adequate comfort level or baseline comfort level:   Encourage patient to monitor pain and request assistance   Assess pain using appropriate pain scale   Administer analgesics based on type and severity of pain and evaluate response     Problem: Neurosensory - Adult  Goal: Achieves stable or improved neurological status  Outcome: Progressing  Goal: Absence of seizures  Outcome: Progressing  Goal: Remains free of injury related to seizures activity  Outcome: Progressing  Goal: Achieves maximal functionality and self care  Outcome: Progressing

## 2025-04-25 NOTE — DISCHARGE INSTRUCTIONS
[facility] Stroke Program Survey  The [facility] [place] values your feedback related to your recent hospital visit and admission. We strive to improve our Neuroscience program to promote better outcomes and recoveries for all our patients.  The anonymous survey below consists of a few questions that are related to your stay and around your Stroke diagnosis, treatment, and recovery. It is anonymous and has only a few questions.  The estimated length of time needed to complete this survey is 3 minutes or less. Thank you for completing this survey!

## 2025-04-25 NOTE — NURSE NAVIGATOR
L basal ganglia ICH     Verified educational Stroke booklet in room for patient and/or family to review. Patient's personal risk factors specific to stroke/TIA include: HLD; DM; smoking; ETOH; use of illicit drugs (UDS positive fentanyl and cocaine ), FMH stroke (father); CTA 4/24 shows 90% stenosis in L ICA and 60% stenosis in R ICA     Patient's chart reviewed for Stroke Core Measures and additional needs:    [x]   VTE prophylaxis - SCD R side (L AKA)    []   Antithrombotic (if applicable) - HELD 2/2 ICH    [x]   Swallow screen - SLP consulted / following     [x]   Lipids / A1C ordered or resulted - In process; high intensity statin ordered, see eMAR    [x]   Therapy ordered - Initial PT/OT eval(s) completed, current dispo recs: SNF    [x]   Care plan and Education template    Navigator to continue to follow patient while admitted, to assist with follow up and discharge planning as needed.     Nurse eSignature: Electronically signed by Jessica Arnett RN on 4/25/25 at 2:19 PM EDT - Neuroscience Navigator

## 2025-04-25 NOTE — PLAN OF CARE
Brief note:  nicholas Elmore is a 60 yo male who presents with a L basal ganglia ICH. Given his history of DM, HLD, ICA stenosis seen on CTA head/neck and normal BP on arrival to the [facility] ED, concern for ischemic stroke that converted more so than hypertensive bleed.     No complaints  Still severely dysarthric.  MRI pending.     PHYSICAL EXAM:  Vitals:    04/24/25 1900 04/24/25 1915 04/24/25 1930 04/24/25 2000   BP: 93/61 (!) 84/54 (!) 81/59    Pulse: 72 72 74    Resp: 17 18 17    Temp:    97.9 °F (36.6 °C)   TempSrc:    Oral   SpO2: 94% 95% 91%          General: Alert, no distress, well-nourished  Neurologic  Mental status:   orientation to person, place, time, situation   Attention intact as able to attend well to the exam     Language severe dysarthria but no aphasia   Comprehension intact; follows simple commands    Cranial nerves:   CN2: Visual fields full w/o extinction on confrontational testing  CN 3,4,6: Pupils equal and reactive to light, extraocular muscles intact  CN5: Facial sensation symmetric   CN7: Right facial weakness  CN8: Hearing symmetric to spoken voice  CN9: Palate elevated symmetrically  CN11: Traps full strength on shoulder shrug  CN12: Tongue midline with protrusion    Motor Exam:  LLE exam limited secondary to patient having an AKA.    R  L    Deltoid 3  5   Biceps 3 5   Triceps 3 5   Wrist extension  3 5   Interossei 3 5      R  L    Hip flexion  5  5   Hip extension  5 5   Knee flexion  5 SHERYL     Knee extension  5 SHERYL   Ankle dorsiflexion  5 SHERYL   Ankle plantar flexion  5 SHERYL         Sensory: light touch intact and symmetric in all 4 extremities.  No sensory extinction on bilateral simultaneous stimulation  Cerebellar/coordination: finger nose finger normal without ataxia  Tone: normal in all 4 extremities  Gait: Deferred for safety    OTHER SYSTEMS:  Cardiovascular: Warm, appears well perfused   Respiratory: Easy, non-labored respiratory pattern   Abdominal: Abdomen is without distention

## 2025-04-25 NOTE — PROCEDURES
impairments.  Impairments characterized by premature bolus loss to vallecula and pyriforms, decreased laryngeal elevation and decreased laryngeal vestibule closure.  This resulted in significant silent aspiration of thin and nectar thick liquids, via cup and straw, small and large volume.  Pt produced cued cough but was not effective in clearing aspiration.  Chin tuck, 3 second hold and head turns to right and left were attempted to eliminate aspiration, but were not effective.  Pt was noted to begin coughing after study completed.  No aspiration of puree and honey thick liquids was identified.   Upper Esophageal Screen  Unremarkable, no obvious backflow noted    Treatment Dx and ICD 10: dysphagia  Position: Lateral and upright  Consistencies administered: puree, honey, nectar, thin and solids    Penetration Aspiration Scale (PAS)  [x] 7 Material enters the airway, passes below the vocal folds, and is not ejected from the trachea despite effort    Dysphagia Outcome Severity Scale  [x] Level 4 - Mild moderate dysphagia - Intermittent supervision/cueing. One - two diet consistencies restricted    Recommendations/Treatment  Requires SLP Intervention: Yes    Recommended Exercises:   Effortful swallow    Dysphagia Goals:  The pt will be seen to address the following goals  Goal 1: Patient/caregiver will demonstrate understanding of dysphagia recommendations/concerns  4/25; pt educated to results of MBS. Replayed portion of video and explained anatomy/physiology related to swallowing and rationale for diet recommendations/strategies to improve safety of swallow. Pt stated comprehension but will benefit from cont education  Cont goal    Goal 2: Patient will participate in instrumental assessment of swallow function as appropriate.  4/25; goal met    New goal:  Goal 3:  Pt will consume PO safely without signs or symptoms of aspiration      Education: Images and recommendations were reviewed with the patient following this
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
## 2025-04-26 ENCOUNTER — APPOINTMENT (OUTPATIENT)
Dept: GENERAL RADIOLOGY | Age: 60
DRG: 064 | End: 2025-04-26
Attending: STUDENT IN AN ORGANIZED HEALTH CARE EDUCATION/TRAINING PROGRAM
Payer: MEDICARE

## 2025-04-26 LAB
ANION GAP SERPL CALCULATED.3IONS-SCNC: 11 MMOL/L (ref 3–16)
BUN SERPL-MCNC: 13 MG/DL (ref 7–20)
CALCIUM SERPL-MCNC: 9.4 MG/DL (ref 8.3–10.6)
CHLORIDE SERPL-SCNC: 102 MMOL/L (ref 99–110)
CHOLEST SERPL-MCNC: 186 MG/DL (ref 0–199)
CO2 SERPL-SCNC: 27 MMOL/L (ref 21–32)
CREAT SERPL-MCNC: 0.9 MG/DL (ref 0.9–1.3)
DEPRECATED RDW RBC AUTO: 13.2 % (ref 12.4–15.4)
GFR SERPLBLD CREATININE-BSD FMLA CKD-EPI: >90 ML/MIN/{1.73_M2}
GLUCOSE BLD-MCNC: 175 MG/DL (ref 70–99)
GLUCOSE BLD-MCNC: 218 MG/DL (ref 70–99)
GLUCOSE BLD-MCNC: 289 MG/DL (ref 70–99)
GLUCOSE BLD-MCNC: 375 MG/DL (ref 70–99)
GLUCOSE SERPL-MCNC: 168 MG/DL (ref 70–99)
HCT VFR BLD AUTO: 48.4 % (ref 40.5–52.5)
HDLC SERPL-MCNC: 39 MG/DL (ref 40–60)
HGB BLD-MCNC: 16.4 G/DL (ref 13.5–17.5)
LDLC SERPL CALC-MCNC: 105 MG/DL
MAGNESIUM SERPL-MCNC: 1.94 MG/DL (ref 1.8–2.4)
MCH RBC QN AUTO: 32.2 PG (ref 26–34)
MCHC RBC AUTO-ENTMCNC: 33.8 G/DL (ref 31–36)
MCV RBC AUTO: 95.3 FL (ref 80–100)
PERFORMED ON: ABNORMAL
PHOSPHATE SERPL-MCNC: 4.1 MG/DL (ref 2.5–4.9)
PLATELET # BLD AUTO: 207 K/UL (ref 135–450)
PMV BLD AUTO: 9 FL (ref 5–10.5)
POTASSIUM SERPL-SCNC: 4 MMOL/L (ref 3.5–5.1)
RBC # BLD AUTO: 5.08 M/UL (ref 4.2–5.9)
SODIUM SERPL-SCNC: 140 MMOL/L (ref 136–145)
TRIGL SERPL-MCNC: 212 MG/DL (ref 0–150)
VLDLC SERPL CALC-MCNC: 42 MG/DL
WBC # BLD AUTO: 9 K/UL (ref 4–11)

## 2025-04-26 PROCEDURE — 71046 X-RAY EXAM CHEST 2 VIEWS: CPT

## 2025-04-26 PROCEDURE — 80048 BASIC METABOLIC PNL TOTAL CA: CPT

## 2025-04-26 PROCEDURE — 84100 ASSAY OF PHOSPHORUS: CPT

## 2025-04-26 PROCEDURE — 2580000003 HC RX 258

## 2025-04-26 PROCEDURE — 2500000003 HC RX 250 WO HCPCS

## 2025-04-26 PROCEDURE — 36415 COLL VENOUS BLD VENIPUNCTURE: CPT

## 2025-04-26 PROCEDURE — 6370000000 HC RX 637 (ALT 250 FOR IP): Performed by: HOSPITALIST

## 2025-04-26 PROCEDURE — 83735 ASSAY OF MAGNESIUM: CPT

## 2025-04-26 PROCEDURE — 6370000000 HC RX 637 (ALT 250 FOR IP)

## 2025-04-26 PROCEDURE — 1200000000 HC SEMI PRIVATE

## 2025-04-26 PROCEDURE — 6360000002 HC RX W HCPCS

## 2025-04-26 PROCEDURE — 85027 COMPLETE CBC AUTOMATED: CPT

## 2025-04-26 PROCEDURE — 2060000000 HC ICU INTERMEDIATE R&B

## 2025-04-26 RX ORDER — BUPRENORPHINE HYDROCHLORIDE AND NALOXONE HYDROCHLORIDE DIHYDRATE 2; .5 MG/1; MG/1
2 TABLET SUBLINGUAL PRN
Status: DISCONTINUED | OUTPATIENT
Start: 2025-04-26 | End: 2025-04-30 | Stop reason: HOSPADM

## 2025-04-26 RX ORDER — BENZOCAINE/MENTHOL 6 MG-10 MG
LOZENGE MUCOUS MEMBRANE 2 TIMES DAILY
Status: DISCONTINUED | OUTPATIENT
Start: 2025-04-26 | End: 2025-04-30 | Stop reason: HOSPADM

## 2025-04-26 RX ADMIN — HYDROMORPHONE HYDROCHLORIDE 0.25 MG: 1 INJECTION, SOLUTION INTRAMUSCULAR; INTRAVENOUS; SUBCUTANEOUS at 02:47

## 2025-04-26 RX ADMIN — METHOCARBAMOL 500 MG: 500 TABLET ORAL at 16:23

## 2025-04-26 RX ADMIN — ACETAMINOPHEN 650 MG: 325 TABLET ORAL at 11:32

## 2025-04-26 RX ADMIN — INSULIN LISPRO 6 UNITS: 100 INJECTION, SOLUTION INTRAVENOUS; SUBCUTANEOUS at 20:41

## 2025-04-26 RX ADMIN — SODIUM CHLORIDE, PRESERVATIVE FREE 10 ML: 5 INJECTION INTRAVENOUS at 11:34

## 2025-04-26 RX ADMIN — METHOCARBAMOL 500 MG: 500 TABLET ORAL at 11:32

## 2025-04-26 RX ADMIN — SODIUM CHLORIDE, PRESERVATIVE FREE 10 ML: 5 INJECTION INTRAVENOUS at 20:44

## 2025-04-26 RX ADMIN — GABAPENTIN 600 MG: 600 TABLET, FILM COATED ORAL at 11:32

## 2025-04-26 RX ADMIN — HYDROCORTISONE: 1 CREAM TOPICAL at 16:15

## 2025-04-26 RX ADMIN — INSULIN LISPRO 2 UNITS: 100 INJECTION, SOLUTION INTRAVENOUS; SUBCUTANEOUS at 14:21

## 2025-04-26 RX ADMIN — ACETAMINOPHEN 650 MG: 325 TABLET ORAL at 16:23

## 2025-04-26 RX ADMIN — ACETAMINOPHEN 650 MG: 325 TABLET ORAL at 20:38

## 2025-04-26 RX ADMIN — GABAPENTIN 600 MG: 600 TABLET, FILM COATED ORAL at 20:43

## 2025-04-26 RX ADMIN — Medication 100 MG: at 11:32

## 2025-04-26 RX ADMIN — PANTOPRAZOLE SODIUM 40 MG: 40 INJECTION, POWDER, FOR SOLUTION INTRAVENOUS at 11:33

## 2025-04-26 RX ADMIN — HYDROCORTISONE: 1 CREAM TOPICAL at 20:38

## 2025-04-26 RX ADMIN — GABAPENTIN 600 MG: 600 TABLET, FILM COATED ORAL at 13:08

## 2025-04-26 RX ADMIN — METHOCARBAMOL 500 MG: 500 TABLET ORAL at 20:43

## 2025-04-26 RX ADMIN — SODIUM CHLORIDE, PRESERVATIVE FREE 10 ML: 5 INJECTION INTRAVENOUS at 20:45

## 2025-04-26 RX ADMIN — INSULIN GLARGINE 5 UNITS: 100 INJECTION, SOLUTION SUBCUTANEOUS at 20:39

## 2025-04-26 RX ADMIN — INSULIN LISPRO 4 UNITS: 100 INJECTION, SOLUTION INTRAVENOUS; SUBCUTANEOUS at 16:22

## 2025-04-26 RX ADMIN — OXYCODONE 2.5 MG: 5 TABLET ORAL at 11:32

## 2025-04-26 RX ADMIN — ACETAMINOPHEN 650 MG: 325 TABLET ORAL at 13:07

## 2025-04-26 RX ADMIN — ATORVASTATIN CALCIUM 40 MG: 40 TABLET, FILM COATED ORAL at 11:33

## 2025-04-26 ASSESSMENT — PAIN DESCRIPTION - DESCRIPTORS
DESCRIPTORS: ACHING

## 2025-04-26 ASSESSMENT — PAIN SCALES - GENERAL
PAINLEVEL_OUTOF10: 7
PAINLEVEL_OUTOF10: 8
PAINLEVEL_OUTOF10: 0
PAINLEVEL_OUTOF10: 8
PAINLEVEL_OUTOF10: 10
PAINLEVEL_OUTOF10: 7
PAINLEVEL_OUTOF10: 10
PAINLEVEL_OUTOF10: 6

## 2025-04-26 ASSESSMENT — PAIN DESCRIPTION - LOCATION
LOCATION: FOOT
LOCATION: FOOT
LOCATION: FOOT;SHOULDER
LOCATION: FOOT
LOCATION: FOOT

## 2025-04-26 ASSESSMENT — PAIN DESCRIPTION - ONSET
ONSET: ON-GOING

## 2025-04-26 ASSESSMENT — PAIN DESCRIPTION - PAIN TYPE
TYPE: ACUTE PAIN
TYPE: CHRONIC PAIN
TYPE: CHRONIC PAIN

## 2025-04-26 ASSESSMENT — PAIN DESCRIPTION - FREQUENCY
FREQUENCY: CONTINUOUS

## 2025-04-26 ASSESSMENT — PAIN DESCRIPTION - ORIENTATION
ORIENTATION: RIGHT

## 2025-04-26 ASSESSMENT — PAIN - FUNCTIONAL ASSESSMENT
PAIN_FUNCTIONAL_ASSESSMENT: PREVENTS OR INTERFERES SOME ACTIVE ACTIVITIES AND ADLS
PAIN_FUNCTIONAL_ASSESSMENT: PREVENTS OR INTERFERES SOME ACTIVE ACTIVITIES AND ADLS
PAIN_FUNCTIONAL_ASSESSMENT: ACTIVITIES ARE NOT PREVENTED

## 2025-04-26 NOTE — PLAN OF CARE
Problem: Discharge Planning  Goal: Discharge to home or other facility with appropriate resources  Outcome: Progressing   Pt involved in discharge planning. Barriers to discharge discussed with patient. Discharge learning needs identified. Discuss with patient any additional needed resources and transportation plans. Case management following plan of care.    Problem: Pain  Goal: Verbalizes/displays adequate comfort level or baseline comfort level  4/26/2025 1433 by Thu Gutierrez, RN  Outcome: Progressing    Problem: Neurosensory - Adult  Goal: Achieves stable or improved neurological status  4/26/2025 1433 by Thu Gutierrez, RN    Problem: Respiratory - Adult  Goal: Achieves optimal ventilation and oxygenation  4/26/2025 1433 by Thu Gutierrez RN  HOB elevated, IS use encouraged.     Problem: Safety - Adult  Goal: Free from fall injury  4/26/2025 0323 by Katelynn Walter RN  Outcome: Progressing  All fall precautions in place. Bed locked and in lowest position with alarm on. Overbed table and personal belonings within reach. Call light within reach and patient instructed to use call light for assistance. Non-skid socks on.

## 2025-04-26 NOTE — PLAN OF CARE
Problem: Pain  Goal: Verbalizes/displays adequate comfort level or baseline comfort level  Outcome: Progressing  Note: Pt taking IV pain medication for right foot pain     Problem: Neurosensory - Adult  Goal: Achieves stable or improved neurological status  Outcome: Progressing  Note: No change in neuro status. NIH 6  Goal: Absence of seizures  Note: No seizure activity this shift     Problem: Musculoskeletal - Adult  Goal: Return mobility to safest level of function  Outcome: Progressing  Note: Pt is baseline WC bound w/ HX of left AKA.      Problem: Respiratory - Adult  Goal: Achieves optimal ventilation and oxygenation  Outcome: Progressing  Note: Pt made NPO d/t violent coughing episode. SaO2 WNL. RR unlabored.      Problem: Safety - Adult  Goal: Free from fall injury  Outcome: Progressing  Note: Pt will remain free of falls for the duration of the shift. Pt is A/O x 4 and uses the call light appropriately for needs.  Pt is WC bound at baseline.

## 2025-04-27 LAB
ANION GAP SERPL CALCULATED.3IONS-SCNC: 10 MMOL/L (ref 3–16)
BUN SERPL-MCNC: 11 MG/DL (ref 7–20)
CALCIUM SERPL-MCNC: 9 MG/DL (ref 8.3–10.6)
CHLORIDE SERPL-SCNC: 102 MMOL/L (ref 99–110)
CO2 SERPL-SCNC: 24 MMOL/L (ref 21–32)
CREAT SERPL-MCNC: 0.7 MG/DL (ref 0.9–1.3)
DEPRECATED RDW RBC AUTO: 12.8 % (ref 12.4–15.4)
GFR SERPLBLD CREATININE-BSD FMLA CKD-EPI: >90 ML/MIN/{1.73_M2}
GLUCOSE BLD-MCNC: 200 MG/DL (ref 70–99)
GLUCOSE BLD-MCNC: 219 MG/DL (ref 70–99)
GLUCOSE BLD-MCNC: 247 MG/DL (ref 70–99)
GLUCOSE BLD-MCNC: 261 MG/DL (ref 70–99)
GLUCOSE SERPL-MCNC: 275 MG/DL (ref 70–99)
HCT VFR BLD AUTO: 43.8 % (ref 40.5–52.5)
HGB BLD-MCNC: 15.3 G/DL (ref 13.5–17.5)
MAGNESIUM SERPL-MCNC: 1.77 MG/DL (ref 1.8–2.4)
MCH RBC QN AUTO: 32.5 PG (ref 26–34)
MCHC RBC AUTO-ENTMCNC: 34.9 G/DL (ref 31–36)
MCV RBC AUTO: 93.1 FL (ref 80–100)
PERFORMED ON: ABNORMAL
PHOSPHATE SERPL-MCNC: 3.3 MG/DL (ref 2.5–4.9)
PLATELET # BLD AUTO: 172 K/UL (ref 135–450)
PMV BLD AUTO: 8.9 FL (ref 5–10.5)
POTASSIUM SERPL-SCNC: 3.7 MMOL/L (ref 3.5–5.1)
RBC # BLD AUTO: 4.71 M/UL (ref 4.2–5.9)
SODIUM SERPL-SCNC: 136 MMOL/L (ref 136–145)
WBC # BLD AUTO: 7.5 K/UL (ref 4–11)

## 2025-04-27 PROCEDURE — 2580000003 HC RX 258

## 2025-04-27 PROCEDURE — 2060000000 HC ICU INTERMEDIATE R&B

## 2025-04-27 PROCEDURE — 6360000002 HC RX W HCPCS

## 2025-04-27 PROCEDURE — 83735 ASSAY OF MAGNESIUM: CPT

## 2025-04-27 PROCEDURE — 92526 ORAL FUNCTION THERAPY: CPT

## 2025-04-27 PROCEDURE — 2500000003 HC RX 250 WO HCPCS

## 2025-04-27 PROCEDURE — 6370000000 HC RX 637 (ALT 250 FOR IP): Performed by: HOSPITALIST

## 2025-04-27 PROCEDURE — 85027 COMPLETE CBC AUTOMATED: CPT

## 2025-04-27 PROCEDURE — 83036 HEMOGLOBIN GLYCOSYLATED A1C: CPT

## 2025-04-27 PROCEDURE — 84100 ASSAY OF PHOSPHORUS: CPT

## 2025-04-27 PROCEDURE — 6370000000 HC RX 637 (ALT 250 FOR IP)

## 2025-04-27 PROCEDURE — 80048 BASIC METABOLIC PNL TOTAL CA: CPT

## 2025-04-27 PROCEDURE — 36415 COLL VENOUS BLD VENIPUNCTURE: CPT

## 2025-04-27 RX ORDER — PANTOPRAZOLE SODIUM 40 MG/1
40 TABLET, DELAYED RELEASE ORAL
Status: DISCONTINUED | OUTPATIENT
Start: 2025-04-28 | End: 2025-04-30 | Stop reason: HOSPADM

## 2025-04-27 RX ORDER — INSULIN GLARGINE 100 [IU]/ML
15 INJECTION, SOLUTION SUBCUTANEOUS NIGHTLY
Status: DISCONTINUED | OUTPATIENT
Start: 2025-04-27 | End: 2025-04-28

## 2025-04-27 RX ORDER — INSULIN LISPRO 100 [IU]/ML
7 INJECTION, SOLUTION INTRAVENOUS; SUBCUTANEOUS
Status: DISCONTINUED | OUTPATIENT
Start: 2025-04-27 | End: 2025-04-28

## 2025-04-27 RX ORDER — INSULIN GLARGINE 100 [IU]/ML
12 INJECTION, SOLUTION SUBCUTANEOUS NIGHTLY
Status: DISCONTINUED | OUTPATIENT
Start: 2025-04-27 | End: 2025-04-27

## 2025-04-27 RX ORDER — LANOLIN ALCOHOL/MO/W.PET/CERES
400 CREAM (GRAM) TOPICAL 2 TIMES DAILY
Status: DISCONTINUED | OUTPATIENT
Start: 2025-04-27 | End: 2025-04-30 | Stop reason: HOSPADM

## 2025-04-27 RX ADMIN — INSULIN LISPRO 7 UNITS: 100 INJECTION, SOLUTION INTRAVENOUS; SUBCUTANEOUS at 12:33

## 2025-04-27 RX ADMIN — HYDROCORTISONE: 1 CREAM TOPICAL at 14:05

## 2025-04-27 RX ADMIN — METHOCARBAMOL 500 MG: 500 TABLET ORAL at 20:13

## 2025-04-27 RX ADMIN — PANTOPRAZOLE SODIUM 40 MG: 40 INJECTION, POWDER, FOR SOLUTION INTRAVENOUS at 08:49

## 2025-04-27 RX ADMIN — INSULIN LISPRO 7 UNITS: 100 INJECTION, SOLUTION INTRAVENOUS; SUBCUTANEOUS at 10:19

## 2025-04-27 RX ADMIN — SODIUM CHLORIDE, PRESERVATIVE FREE 10 ML: 5 INJECTION INTRAVENOUS at 08:49

## 2025-04-27 RX ADMIN — HYDROCORTISONE: 1 CREAM TOPICAL at 20:14

## 2025-04-27 RX ADMIN — GABAPENTIN 600 MG: 600 TABLET, FILM COATED ORAL at 20:13

## 2025-04-27 RX ADMIN — INSULIN LISPRO 2 UNITS: 100 INJECTION, SOLUTION INTRAVENOUS; SUBCUTANEOUS at 08:34

## 2025-04-27 RX ADMIN — ACETAMINOPHEN 650 MG: 325 TABLET ORAL at 14:01

## 2025-04-27 RX ADMIN — INSULIN GLARGINE 15 UNITS: 100 INJECTION, SOLUTION SUBCUTANEOUS at 20:12

## 2025-04-27 RX ADMIN — INSULIN LISPRO 2 UNITS: 100 INJECTION, SOLUTION INTRAVENOUS; SUBCUTANEOUS at 20:12

## 2025-04-27 RX ADMIN — GABAPENTIN 600 MG: 600 TABLET, FILM COATED ORAL at 14:02

## 2025-04-27 RX ADMIN — ACETAMINOPHEN 650 MG: 325 TABLET ORAL at 08:37

## 2025-04-27 RX ADMIN — ATORVASTATIN CALCIUM 40 MG: 40 TABLET, FILM COATED ORAL at 08:42

## 2025-04-27 RX ADMIN — SODIUM CHLORIDE, PRESERVATIVE FREE 10 ML: 5 INJECTION INTRAVENOUS at 20:14

## 2025-04-27 RX ADMIN — GABAPENTIN 600 MG: 600 TABLET, FILM COATED ORAL at 08:40

## 2025-04-27 RX ADMIN — Medication 100 MG: at 08:42

## 2025-04-27 RX ADMIN — METHOCARBAMOL 500 MG: 500 TABLET ORAL at 17:11

## 2025-04-27 RX ADMIN — INSULIN LISPRO 2 UNITS: 100 INJECTION, SOLUTION INTRAVENOUS; SUBCUTANEOUS at 17:06

## 2025-04-27 RX ADMIN — Medication 400 MG: at 20:13

## 2025-04-27 RX ADMIN — SODIUM CHLORIDE, PRESERVATIVE FREE 10 ML: 5 INJECTION INTRAVENOUS at 20:13

## 2025-04-27 RX ADMIN — ACETAMINOPHEN 650 MG: 325 TABLET ORAL at 20:13

## 2025-04-27 RX ADMIN — Medication 400 MG: at 10:21

## 2025-04-27 RX ADMIN — SODIUM CHLORIDE, PRESERVATIVE FREE 10 ML: 5 INJECTION INTRAVENOUS at 08:54

## 2025-04-27 RX ADMIN — INSULIN LISPRO 7 UNITS: 100 INJECTION, SOLUTION INTRAVENOUS; SUBCUTANEOUS at 17:14

## 2025-04-27 RX ADMIN — INSULIN LISPRO 4 UNITS: 100 INJECTION, SOLUTION INTRAVENOUS; SUBCUTANEOUS at 12:26

## 2025-04-27 ASSESSMENT — PAIN DESCRIPTION - DESCRIPTORS: DESCRIPTORS: NAGGING;PRESSURE

## 2025-04-27 ASSESSMENT — PAIN DESCRIPTION - FREQUENCY: FREQUENCY: CONTINUOUS

## 2025-04-27 ASSESSMENT — PAIN DESCRIPTION - LOCATION: LOCATION: SHOULDER

## 2025-04-27 ASSESSMENT — PAIN SCALES - GENERAL
PAINLEVEL_OUTOF10: 7
PAINLEVEL_OUTOF10: 5

## 2025-04-27 ASSESSMENT — PAIN DESCRIPTION - PAIN TYPE: TYPE: ACUTE PAIN

## 2025-04-27 ASSESSMENT — PAIN DESCRIPTION - ONSET: ONSET: ON-GOING

## 2025-04-27 ASSESSMENT — PAIN - FUNCTIONAL ASSESSMENT: PAIN_FUNCTIONAL_ASSESSMENT: ACTIVITIES ARE NOT PREVENTED

## 2025-04-27 ASSESSMENT — PAIN DESCRIPTION - ORIENTATION: ORIENTATION: RIGHT

## 2025-04-27 NOTE — PLAN OF CARE
Problem: Chronic Conditions and Co-morbidities  Goal: Patient's chronic conditions and co-morbidity symptoms are monitored and maintained or improved  Outcome: Progressing     Problem: Discharge Planning  Goal: Discharge to home or other facility with appropriate resources  Outcome: Progressing     Problem: Pain  Goal: Verbalizes/displays adequate comfort level or baseline comfort level  Outcome: Progressing   Pt endorsing pain to scattered rash and heel wound. Being treated with PRN pain medication, rest, and frequent repositioning with pillow support for comfort and pressure relief. Pt reports some relief from pain with above interventions.     Problem: Neurosensory - Adult  Goal: Achieves stable or improved neurological status  Outcome: Progressing   Patient A&O x4; NIH remains a 5 this shift.     Problem: Neurosensory - Adult  Goal: Achieves maximal functionality and self care  Outcome: Progressing    Patient A&O x4; NIH remains a 5 this shift.     Problem: Safety - Adult  Goal: Free from fall injury  Outcome: Progressing   All fall precautions in place. Bed locked and in lowest position with alarm on. Overbed table and personal belonings within reach. Call light within reach and patient instructed to use call light for assistance. Non-skid socks on.     Problem: Skin/Tissue Integrity - Adult  Goal: Incisions, wounds, or drain sites healing without S/S of infection  Outcome: Progressing   Skin assessed this shift. Skin is CDI. Nataly care completed as necessary. Turning patient q2h to reduce pressure and prevent injury. Bath completed this shift.     Problem: Musculoskeletal - Adult  Goal: Maintain proper alignment of affected body part  Outcome: Progressing   Patient turned and supported appropriately with proper alignment of the non-amputated leg.     Problem: ABCDS Injury Assessment  Goal: Absence of physical injury  Outcome: Progressing   All safety equipment at bedside.

## 2025-04-28 LAB
ANION GAP SERPL CALCULATED.3IONS-SCNC: 9 MMOL/L (ref 3–16)
BUN SERPL-MCNC: 7 MG/DL (ref 7–20)
CALCIUM SERPL-MCNC: 8.8 MG/DL (ref 8.3–10.6)
CHLORIDE SERPL-SCNC: 101 MMOL/L (ref 99–110)
CO2 SERPL-SCNC: 26 MMOL/L (ref 21–32)
CREAT SERPL-MCNC: 0.6 MG/DL (ref 0.9–1.3)
DEPRECATED RDW RBC AUTO: 12.7 % (ref 12.4–15.4)
EST. AVERAGE GLUCOSE BLD GHB EST-MCNC: 303.4 MG/DL
GFR SERPLBLD CREATININE-BSD FMLA CKD-EPI: >90 ML/MIN/{1.73_M2}
GLUCOSE BLD-MCNC: 218 MG/DL (ref 70–99)
GLUCOSE BLD-MCNC: 299 MG/DL (ref 70–99)
GLUCOSE BLD-MCNC: 323 MG/DL (ref 70–99)
GLUCOSE BLD-MCNC: 343 MG/DL (ref 70–99)
GLUCOSE SERPL-MCNC: 239 MG/DL (ref 70–99)
HBA1C MFR BLD: 12.2 %
HCT VFR BLD AUTO: 40.5 % (ref 40.5–52.5)
HGB BLD-MCNC: 14.4 G/DL (ref 13.5–17.5)
MAGNESIUM SERPL-MCNC: 1.7 MG/DL (ref 1.8–2.4)
MCH RBC QN AUTO: 32.6 PG (ref 26–34)
MCHC RBC AUTO-ENTMCNC: 35.6 G/DL (ref 31–36)
MCV RBC AUTO: 91.5 FL (ref 80–100)
PERFORMED ON: ABNORMAL
PHOSPHATE SERPL-MCNC: 3.2 MG/DL (ref 2.5–4.9)
PLATELET # BLD AUTO: 170 K/UL (ref 135–450)
PMV BLD AUTO: 8.9 FL (ref 5–10.5)
POTASSIUM SERPL-SCNC: 3.4 MMOL/L (ref 3.5–5.1)
RBC # BLD AUTO: 4.43 M/UL (ref 4.2–5.9)
SODIUM SERPL-SCNC: 136 MMOL/L (ref 136–145)
WBC # BLD AUTO: 5.9 K/UL (ref 4–11)

## 2025-04-28 PROCEDURE — 6360000002 HC RX W HCPCS: Performed by: HOSPITALIST

## 2025-04-28 PROCEDURE — 92526 ORAL FUNCTION THERAPY: CPT

## 2025-04-28 PROCEDURE — 2500000003 HC RX 250 WO HCPCS

## 2025-04-28 PROCEDURE — 84100 ASSAY OF PHOSPHORUS: CPT

## 2025-04-28 PROCEDURE — 6370000000 HC RX 637 (ALT 250 FOR IP): Performed by: NURSE PRACTITIONER

## 2025-04-28 PROCEDURE — 2060000000 HC ICU INTERMEDIATE R&B

## 2025-04-28 PROCEDURE — 92507 TX SP LANG VOICE COMM INDIV: CPT

## 2025-04-28 PROCEDURE — 80048 BASIC METABOLIC PNL TOTAL CA: CPT

## 2025-04-28 PROCEDURE — 83735 ASSAY OF MAGNESIUM: CPT

## 2025-04-28 PROCEDURE — 85027 COMPLETE CBC AUTOMATED: CPT

## 2025-04-28 PROCEDURE — 6370000000 HC RX 637 (ALT 250 FOR IP): Performed by: HOSPITALIST

## 2025-04-28 PROCEDURE — 6370000000 HC RX 637 (ALT 250 FOR IP)

## 2025-04-28 PROCEDURE — 36415 COLL VENOUS BLD VENIPUNCTURE: CPT

## 2025-04-28 RX ORDER — INSULIN LISPRO 100 [IU]/ML
10 INJECTION, SOLUTION INTRAVENOUS; SUBCUTANEOUS
Status: DISCONTINUED | OUTPATIENT
Start: 2025-04-28 | End: 2025-04-28

## 2025-04-28 RX ORDER — INSULIN LISPRO 100 [IU]/ML
12 INJECTION, SOLUTION INTRAVENOUS; SUBCUTANEOUS
Status: DISCONTINUED | OUTPATIENT
Start: 2025-04-28 | End: 2025-04-29

## 2025-04-28 RX ORDER — FONDAPARINUX SODIUM 2.5 MG/.5ML
2.5 INJECTION SUBCUTANEOUS DAILY
Status: DISCONTINUED | OUTPATIENT
Start: 2025-04-28 | End: 2025-04-30 | Stop reason: HOSPADM

## 2025-04-28 RX ORDER — INSULIN GLARGINE 100 [IU]/ML
18 INJECTION, SOLUTION SUBCUTANEOUS NIGHTLY
Status: DISCONTINUED | OUTPATIENT
Start: 2025-04-28 | End: 2025-04-29

## 2025-04-28 RX ORDER — POTASSIUM CHLORIDE 1500 MG/1
20 TABLET, EXTENDED RELEASE ORAL ONCE
Status: COMPLETED | OUTPATIENT
Start: 2025-04-28 | End: 2025-04-28

## 2025-04-28 RX ADMIN — Medication 100 MG: at 08:41

## 2025-04-28 RX ADMIN — POTASSIUM CHLORIDE 20 MEQ: 1500 TABLET, EXTENDED RELEASE ORAL at 08:40

## 2025-04-28 RX ADMIN — SODIUM CHLORIDE, PRESERVATIVE FREE 10 ML: 5 INJECTION INTRAVENOUS at 09:35

## 2025-04-28 RX ADMIN — INSULIN LISPRO 2 UNITS: 100 INJECTION, SOLUTION INTRAVENOUS; SUBCUTANEOUS at 08:44

## 2025-04-28 RX ADMIN — INSULIN LISPRO 12 UNITS: 100 INJECTION, SOLUTION INTRAVENOUS; SUBCUTANEOUS at 17:14

## 2025-04-28 RX ADMIN — HYDROCORTISONE: 1 CREAM TOPICAL at 09:16

## 2025-04-28 RX ADMIN — INSULIN GLARGINE 18 UNITS: 100 INJECTION, SOLUTION SUBCUTANEOUS at 20:42

## 2025-04-28 RX ADMIN — Medication 400 MG: at 08:40

## 2025-04-28 RX ADMIN — GABAPENTIN 600 MG: 600 TABLET, FILM COATED ORAL at 12:49

## 2025-04-28 RX ADMIN — Medication 400 MG: at 20:42

## 2025-04-28 RX ADMIN — GABAPENTIN 600 MG: 600 TABLET, FILM COATED ORAL at 08:40

## 2025-04-28 RX ADMIN — SODIUM CHLORIDE, PRESERVATIVE FREE 10 ML: 5 INJECTION INTRAVENOUS at 08:44

## 2025-04-28 RX ADMIN — ATORVASTATIN CALCIUM 40 MG: 40 TABLET, FILM COATED ORAL at 08:40

## 2025-04-28 RX ADMIN — INSULIN LISPRO 12 UNITS: 100 INJECTION, SOLUTION INTRAVENOUS; SUBCUTANEOUS at 12:51

## 2025-04-28 RX ADMIN — ACETAMINOPHEN 650 MG: 325 TABLET ORAL at 20:42

## 2025-04-28 RX ADMIN — GABAPENTIN 600 MG: 600 TABLET, FILM COATED ORAL at 20:42

## 2025-04-28 RX ADMIN — INSULIN LISPRO 6 UNITS: 100 INJECTION, SOLUTION INTRAVENOUS; SUBCUTANEOUS at 17:13

## 2025-04-28 RX ADMIN — Medication 3 MG: at 22:13

## 2025-04-28 RX ADMIN — FONDAPARINUX SODIUM 2.5 MG: 2.5 INJECTION, SOLUTION SUBCUTANEOUS at 09:35

## 2025-04-28 RX ADMIN — INSULIN LISPRO 8 UNITS: 100 INJECTION, SOLUTION INTRAVENOUS; SUBCUTANEOUS at 20:43

## 2025-04-28 RX ADMIN — INSULIN LISPRO 10 UNITS: 100 INJECTION, SOLUTION INTRAVENOUS; SUBCUTANEOUS at 08:44

## 2025-04-28 RX ADMIN — SODIUM CHLORIDE, PRESERVATIVE FREE 10 ML: 5 INJECTION INTRAVENOUS at 20:51

## 2025-04-28 RX ADMIN — HYDROCORTISONE: 1 CREAM TOPICAL at 22:13

## 2025-04-28 RX ADMIN — INSULIN LISPRO 4 UNITS: 100 INJECTION, SOLUTION INTRAVENOUS; SUBCUTANEOUS at 12:51

## 2025-04-28 RX ADMIN — ACETAMINOPHEN 650 MG: 325 TABLET ORAL at 12:49

## 2025-04-28 RX ADMIN — ACETAMINOPHEN 650 MG: 325 TABLET ORAL at 08:40

## 2025-04-28 RX ADMIN — PANTOPRAZOLE SODIUM 40 MG: 40 TABLET, DELAYED RELEASE ORAL at 05:28

## 2025-04-28 NOTE — PLAN OF CARE
Problem: Chronic Conditions and Co-morbidities  Goal: Patient's chronic conditions and co-morbidity symptoms are monitored and maintained or improved  Outcome: Progressing  Flowsheets (Taken 4/28/2025 1532)  Care Plan - Patient's Chronic Conditions and Co-Morbidity Symptoms are Monitored and Maintained or Improved:   Monitor and assess patient's chronic conditions and comorbid symptoms for stability, deterioration, or improvement   Collaborate with multidisciplinary team to address chronic and comorbid conditions and prevent exacerbation or deterioration     Problem: Discharge Planning  Goal: Discharge to home or other facility with appropriate resources  Outcome: Progressing  Flowsheets (Taken 4/28/2025 1532)  Discharge to home or other facility with appropriate resources:   Identify barriers to discharge with patient and caregiver   Arrange for needed discharge resources and transportation as appropriate   Identify discharge learning needs (meds, wound care, etc)     Problem: Pain  Goal: Verbalizes/displays adequate comfort level or baseline comfort level  Outcome: Progressing  Flowsheets (Taken 4/28/2025 1532)  Verbalizes/displays adequate comfort level or baseline comfort level:   Encourage patient to monitor pain and request assistance   Assess pain using appropriate pain scale   Administer analgesics based on type and severity of pain and evaluate response   Implement non-pharmacological measures as appropriate and evaluate response   Consider cultural and social influences on pain and pain management     Problem: Neurosensory - Adult  Goal: Achieves stable or improved neurological status  Outcome: Progressing  Flowsheets (Taken 4/28/2025 1532)  Achieves stable or improved neurological status:   Assess for and report changes in neurological status   Maintain blood pressure and fluid volume within ordered parameters to optimize cerebral perfusion and minimize risk of hemorrhage  Note: Pt. A&Ox4, speech is

## 2025-04-29 ENCOUNTER — APPOINTMENT (OUTPATIENT)
Dept: CT IMAGING | Age: 60
DRG: 064 | End: 2025-04-29
Attending: STUDENT IN AN ORGANIZED HEALTH CARE EDUCATION/TRAINING PROGRAM
Payer: MEDICARE

## 2025-04-29 LAB
ANION GAP SERPL CALCULATED.3IONS-SCNC: 7 MMOL/L (ref 3–16)
BUN SERPL-MCNC: 4 MG/DL (ref 7–20)
CALCIUM SERPL-MCNC: 9.1 MG/DL (ref 8.3–10.6)
CHLORIDE SERPL-SCNC: 101 MMOL/L (ref 99–110)
CO2 SERPL-SCNC: 30 MMOL/L (ref 21–32)
CREAT SERPL-MCNC: 0.6 MG/DL (ref 0.9–1.3)
DEPRECATED RDW RBC AUTO: 12.8 % (ref 12.4–15.4)
EST. AVERAGE GLUCOSE BLD GHB EST-MCNC: 306.3 MG/DL
GFR SERPLBLD CREATININE-BSD FMLA CKD-EPI: >90 ML/MIN/{1.73_M2}
GLUCOSE BLD-MCNC: 235 MG/DL (ref 70–99)
GLUCOSE BLD-MCNC: 285 MG/DL (ref 70–99)
GLUCOSE BLD-MCNC: 287 MG/DL (ref 70–99)
GLUCOSE BLD-MCNC: 354 MG/DL (ref 70–99)
GLUCOSE SERPL-MCNC: 255 MG/DL (ref 70–99)
HBA1C MFR BLD: 12.3 %
HCT VFR BLD AUTO: 43.8 % (ref 40.5–52.5)
HGB BLD-MCNC: 15.6 G/DL (ref 13.5–17.5)
MAGNESIUM SERPL-MCNC: 1.87 MG/DL (ref 1.8–2.4)
MCH RBC QN AUTO: 32.5 PG (ref 26–34)
MCHC RBC AUTO-ENTMCNC: 35.6 G/DL (ref 31–36)
MCV RBC AUTO: 91.3 FL (ref 80–100)
PERFORMED ON: ABNORMAL
PHOSPHATE SERPL-MCNC: 3.1 MG/DL (ref 2.5–4.9)
PLATELET # BLD AUTO: 167 K/UL (ref 135–450)
PMV BLD AUTO: 9.1 FL (ref 5–10.5)
POTASSIUM SERPL-SCNC: 4.2 MMOL/L (ref 3.5–5.1)
RBC # BLD AUTO: 4.8 M/UL (ref 4.2–5.9)
SODIUM SERPL-SCNC: 138 MMOL/L (ref 136–145)
WBC # BLD AUTO: 5.7 K/UL (ref 4–11)

## 2025-04-29 PROCEDURE — 84100 ASSAY OF PHOSPHORUS: CPT

## 2025-04-29 PROCEDURE — 36415 COLL VENOUS BLD VENIPUNCTURE: CPT

## 2025-04-29 PROCEDURE — 97530 THERAPEUTIC ACTIVITIES: CPT

## 2025-04-29 PROCEDURE — 80048 BASIC METABOLIC PNL TOTAL CA: CPT

## 2025-04-29 PROCEDURE — 97110 THERAPEUTIC EXERCISES: CPT

## 2025-04-29 PROCEDURE — 6370000000 HC RX 637 (ALT 250 FOR IP): Performed by: NURSE PRACTITIONER

## 2025-04-29 PROCEDURE — 6360000002 HC RX W HCPCS: Performed by: HOSPITALIST

## 2025-04-29 PROCEDURE — 97535 SELF CARE MNGMENT TRAINING: CPT

## 2025-04-29 PROCEDURE — 70450 CT HEAD/BRAIN W/O DYE: CPT

## 2025-04-29 PROCEDURE — 6370000000 HC RX 637 (ALT 250 FOR IP)

## 2025-04-29 PROCEDURE — 83735 ASSAY OF MAGNESIUM: CPT

## 2025-04-29 PROCEDURE — 92507 TX SP LANG VOICE COMM INDIV: CPT

## 2025-04-29 PROCEDURE — 85027 COMPLETE CBC AUTOMATED: CPT

## 2025-04-29 PROCEDURE — 2500000003 HC RX 250 WO HCPCS

## 2025-04-29 PROCEDURE — 2060000000 HC ICU INTERMEDIATE R&B

## 2025-04-29 PROCEDURE — 92526 ORAL FUNCTION THERAPY: CPT

## 2025-04-29 PROCEDURE — 6370000000 HC RX 637 (ALT 250 FOR IP): Performed by: HOSPITALIST

## 2025-04-29 RX ORDER — INSULIN GLARGINE 100 [IU]/ML
21 INJECTION, SOLUTION SUBCUTANEOUS NIGHTLY
Status: DISCONTINUED | OUTPATIENT
Start: 2025-04-29 | End: 2025-04-30 | Stop reason: HOSPADM

## 2025-04-29 RX ORDER — ASPIRIN 81 MG/1
81 TABLET ORAL DAILY
Status: DISCONTINUED | OUTPATIENT
Start: 2025-04-29 | End: 2025-04-30 | Stop reason: HOSPADM

## 2025-04-29 RX ORDER — INSULIN LISPRO 100 [IU]/ML
20 INJECTION, SOLUTION INTRAVENOUS; SUBCUTANEOUS
Status: DISCONTINUED | OUTPATIENT
Start: 2025-04-29 | End: 2025-04-30

## 2025-04-29 RX ADMIN — INSULIN LISPRO 20 UNITS: 100 INJECTION, SOLUTION INTRAVENOUS; SUBCUTANEOUS at 11:55

## 2025-04-29 RX ADMIN — SODIUM CHLORIDE, PRESERVATIVE FREE 10 ML: 5 INJECTION INTRAVENOUS at 20:45

## 2025-04-29 RX ADMIN — HYDROCORTISONE: 1 CREAM TOPICAL at 20:46

## 2025-04-29 RX ADMIN — ASPIRIN 81 MG: 81 TABLET, COATED ORAL at 08:46

## 2025-04-29 RX ADMIN — GABAPENTIN 600 MG: 600 TABLET, FILM COATED ORAL at 20:39

## 2025-04-29 RX ADMIN — INSULIN LISPRO 4 UNITS: 100 INJECTION, SOLUTION INTRAVENOUS; SUBCUTANEOUS at 20:38

## 2025-04-29 RX ADMIN — ACETAMINOPHEN 650 MG: 325 TABLET ORAL at 20:39

## 2025-04-29 RX ADMIN — Medication 6 MG: at 22:50

## 2025-04-29 RX ADMIN — Medication 400 MG: at 08:46

## 2025-04-29 RX ADMIN — GABAPENTIN 600 MG: 600 TABLET, FILM COATED ORAL at 14:12

## 2025-04-29 RX ADMIN — SODIUM CHLORIDE, PRESERVATIVE FREE 10 ML: 5 INJECTION INTRAVENOUS at 08:47

## 2025-04-29 RX ADMIN — INSULIN GLARGINE 21 UNITS: 100 INJECTION, SOLUTION SUBCUTANEOUS at 20:39

## 2025-04-29 RX ADMIN — ATORVASTATIN CALCIUM 40 MG: 40 TABLET, FILM COATED ORAL at 08:46

## 2025-04-29 RX ADMIN — ACETAMINOPHEN 650 MG: 325 TABLET ORAL at 06:03

## 2025-04-29 RX ADMIN — Medication 100 MG: at 08:46

## 2025-04-29 RX ADMIN — GABAPENTIN 600 MG: 600 TABLET, FILM COATED ORAL at 08:46

## 2025-04-29 RX ADMIN — FONDAPARINUX SODIUM 2.5 MG: 2.5 INJECTION, SOLUTION SUBCUTANEOUS at 12:02

## 2025-04-29 RX ADMIN — INSULIN LISPRO 20 UNITS: 100 INJECTION, SOLUTION INTRAVENOUS; SUBCUTANEOUS at 17:07

## 2025-04-29 RX ADMIN — ACETAMINOPHEN 650 MG: 325 TABLET ORAL at 08:45

## 2025-04-29 RX ADMIN — INSULIN LISPRO 8 UNITS: 100 INJECTION, SOLUTION INTRAVENOUS; SUBCUTANEOUS at 17:08

## 2025-04-29 RX ADMIN — PANTOPRAZOLE SODIUM 40 MG: 40 TABLET, DELAYED RELEASE ORAL at 05:36

## 2025-04-29 RX ADMIN — Medication 400 MG: at 20:39

## 2025-04-29 RX ADMIN — INSULIN LISPRO 4 UNITS: 100 INJECTION, SOLUTION INTRAVENOUS; SUBCUTANEOUS at 08:46

## 2025-04-29 RX ADMIN — HYDROCORTISONE: 1 CREAM TOPICAL at 08:46

## 2025-04-29 RX ADMIN — INSULIN LISPRO 4 UNITS: 100 INJECTION, SOLUTION INTRAVENOUS; SUBCUTANEOUS at 11:55

## 2025-04-29 ASSESSMENT — PAIN SCALES - GENERAL: PAINLEVEL_OUTOF10: 4

## 2025-04-29 NOTE — PLAN OF CARE
Problem: Pain  Goal: Verbalizes/displays adequate comfort level or baseline comfort level  4/29/2025 0730 by Vale Lara, RN  Outcome: Progressing   No complaints of pain at this time, continuing to monitor and manage per MAR.     Problem: Safety - Adult  Goal: Free from fall injury  4/29/2025 0730 by Vale Lara, RN  Outcome: Progressing   All fall precautions in place. Bed locked and in lowest position with alarm on. Overbed table and personal belonings within reach. Call light within reach and patient instructed to use call light for assistance. Non-skid socks on.

## 2025-04-29 NOTE — PLAN OF CARE
Problem: Discharge Planning  Goal: Discharge to home or other facility with appropriate resources  4/28/2025 2249 by Felisha Tafoya RN  Outcome: Progressing     Problem: Neurosensory - Adult  Goal: Achieves stable or improved neurological status  4/28/2025 2249 by Felisha Tafoya RN  Outcome: Progressing     Problem: Pain  Goal: Verbalizes/displays adequate comfort level or baseline comfort level  4/28/2025 2249 by Felisha Tafoya RN  Outcome: Progressing   Pt pain managed via MAR.     Problem: Safety - Adult  Goal: Free from fall injury  4/28/2025 2249 by Felisha Tafoya RN  Outcome: Progressing   Note: Pt educated on safety measures. Standard safety measures in place, bed in lowest position, bed locked and alarm on,  socks applied, call light and table in reach.

## 2025-04-30 VITALS
TEMPERATURE: 98.5 F | RESPIRATION RATE: 15 BRPM | HEART RATE: 60 BPM | OXYGEN SATURATION: 100 % | WEIGHT: 152.2 LBS | BODY MASS INDEX: 22.54 KG/M2 | SYSTOLIC BLOOD PRESSURE: 108 MMHG | DIASTOLIC BLOOD PRESSURE: 71 MMHG | HEIGHT: 69 IN

## 2025-04-30 LAB
ANION GAP SERPL CALCULATED.3IONS-SCNC: 8 MMOL/L (ref 3–16)
BUN SERPL-MCNC: 7 MG/DL (ref 7–20)
CALCIUM SERPL-MCNC: 8.9 MG/DL (ref 8.3–10.6)
CHLORIDE SERPL-SCNC: 101 MMOL/L (ref 99–110)
CO2 SERPL-SCNC: 27 MMOL/L (ref 21–32)
CREAT SERPL-MCNC: 0.5 MG/DL (ref 0.9–1.3)
DEPRECATED RDW RBC AUTO: 12.8 % (ref 12.4–15.4)
GFR SERPLBLD CREATININE-BSD FMLA CKD-EPI: >90 ML/MIN/{1.73_M2}
GLUCOSE BLD-MCNC: 182 MG/DL (ref 70–99)
GLUCOSE SERPL-MCNC: 223 MG/DL (ref 70–99)
HCT VFR BLD AUTO: 42.5 % (ref 40.5–52.5)
HGB BLD-MCNC: 14.7 G/DL (ref 13.5–17.5)
MAGNESIUM SERPL-MCNC: 1.97 MG/DL (ref 1.8–2.4)
MCH RBC QN AUTO: 32.6 PG (ref 26–34)
MCHC RBC AUTO-ENTMCNC: 34.5 G/DL (ref 31–36)
MCV RBC AUTO: 94.5 FL (ref 80–100)
PERFORMED ON: ABNORMAL
PHOSPHATE SERPL-MCNC: 3.4 MG/DL (ref 2.5–4.9)
PLATELET # BLD AUTO: 171 K/UL (ref 135–450)
PMV BLD AUTO: 9 FL (ref 5–10.5)
POTASSIUM SERPL-SCNC: 4.2 MMOL/L (ref 3.5–5.1)
RBC # BLD AUTO: 4.5 M/UL (ref 4.2–5.9)
SODIUM SERPL-SCNC: 136 MMOL/L (ref 136–145)
WBC # BLD AUTO: 7.2 K/UL (ref 4–11)

## 2025-04-30 PROCEDURE — 6370000000 HC RX 637 (ALT 250 FOR IP)

## 2025-04-30 PROCEDURE — 85027 COMPLETE CBC AUTOMATED: CPT

## 2025-04-30 PROCEDURE — 92526 ORAL FUNCTION THERAPY: CPT

## 2025-04-30 PROCEDURE — 36415 COLL VENOUS BLD VENIPUNCTURE: CPT

## 2025-04-30 PROCEDURE — 2500000003 HC RX 250 WO HCPCS

## 2025-04-30 PROCEDURE — 80048 BASIC METABOLIC PNL TOTAL CA: CPT

## 2025-04-30 PROCEDURE — 84100 ASSAY OF PHOSPHORUS: CPT

## 2025-04-30 PROCEDURE — 92507 TX SP LANG VOICE COMM INDIV: CPT

## 2025-04-30 PROCEDURE — 6370000000 HC RX 637 (ALT 250 FOR IP): Performed by: HOSPITALIST

## 2025-04-30 PROCEDURE — 6360000002 HC RX W HCPCS: Performed by: HOSPITALIST

## 2025-04-30 PROCEDURE — 83735 ASSAY OF MAGNESIUM: CPT

## 2025-04-30 RX ORDER — PANTOPRAZOLE SODIUM 40 MG/1
40 TABLET, DELAYED RELEASE ORAL
Qty: 30 TABLET | Refills: 3 | Status: SHIPPED | OUTPATIENT
Start: 2025-05-01

## 2025-04-30 RX ORDER — INSULIN LISPRO 100 [IU]/ML
23 INJECTION, SOLUTION INTRAVENOUS; SUBCUTANEOUS
Qty: 1 EACH | Refills: 1 | Status: SHIPPED | OUTPATIENT
Start: 2025-04-30

## 2025-04-30 RX ORDER — INSULIN GLARGINE 100 [IU]/ML
21 INJECTION, SOLUTION SUBCUTANEOUS NIGHTLY
Qty: 10 ML | Refills: 3 | Status: SHIPPED
Start: 2025-04-30

## 2025-04-30 RX ORDER — INSULIN LISPRO 100 [IU]/ML
23 INJECTION, SOLUTION INTRAVENOUS; SUBCUTANEOUS
Status: DISCONTINUED | OUTPATIENT
Start: 2025-04-30 | End: 2025-04-30 | Stop reason: HOSPADM

## 2025-04-30 RX ORDER — THIAMINE MONONITRATE (VIT B1) 100 MG
100 TABLET ORAL DAILY
Qty: 30 TABLET | Refills: 1 | Status: SHIPPED | OUTPATIENT
Start: 2025-05-01

## 2025-04-30 RX ORDER — ASPIRIN 81 MG/1
81 TABLET ORAL DAILY
Qty: 30 TABLET | Refills: 3 | Status: SHIPPED | OUTPATIENT
Start: 2025-05-01

## 2025-04-30 RX ADMIN — SODIUM CHLORIDE, PRESERVATIVE FREE 10 ML: 5 INJECTION INTRAVENOUS at 09:34

## 2025-04-30 RX ADMIN — Medication 100 MG: at 08:26

## 2025-04-30 RX ADMIN — ATORVASTATIN CALCIUM 40 MG: 40 TABLET, FILM COATED ORAL at 08:26

## 2025-04-30 RX ADMIN — ASPIRIN 81 MG: 81 TABLET, COATED ORAL at 08:27

## 2025-04-30 RX ADMIN — PANTOPRAZOLE SODIUM 40 MG: 40 TABLET, DELAYED RELEASE ORAL at 06:18

## 2025-04-30 RX ADMIN — Medication 400 MG: at 08:26

## 2025-04-30 RX ADMIN — GABAPENTIN 600 MG: 600 TABLET, FILM COATED ORAL at 08:26

## 2025-04-30 RX ADMIN — ACETAMINOPHEN 650 MG: 325 TABLET ORAL at 08:26

## 2025-04-30 RX ADMIN — INSULIN LISPRO 23 UNITS: 100 INJECTION, SOLUTION INTRAVENOUS; SUBCUTANEOUS at 09:31

## 2025-04-30 RX ADMIN — FONDAPARINUX SODIUM 2.5 MG: 2.5 INJECTION, SOLUTION SUBCUTANEOUS at 09:31

## 2025-04-30 RX ADMIN — INSULIN LISPRO 2 UNITS: 100 INJECTION, SOLUTION INTRAVENOUS; SUBCUTANEOUS at 08:26

## 2025-04-30 NOTE — PLAN OF CARE
Problem: Discharge Planning  Goal: Discharge to home or other facility with appropriate resources  4/30/2025 0858 by Thu Gutierrez RN  Outcome: Progressing  Pt involved in discharge planning. Barriers to discharge discussed with patient. Discharge learning needs identified. Discuss with patient any additional needed resources and transportation plans. Case management following plan of care.      Problem: Pain  Goal: Verbalizes/displays adequate comfort level or baseline comfort level  4/30/2025 0858 by Thu Gutierrez RN  Outcome: Progressing      Problem: Chronic Conditions and Co-morbidities  Goal: Patient's chronic conditions and co-morbidity symptoms are monitored and maintained or improved  4/30/2025 0858 by Thu Gutierrez RN  Outcome: Progressing    Problem: Neurosensory - Adult  Goal: Achieves stable or improved neurological status  4/29/2025 2107 by Felisha Tafoya RN  Outcome: Progressing

## 2025-04-30 NOTE — PLAN OF CARE
Problem: Behavior  Goal: Pt/Family maintain appropriate behavior and adhere to behavioral management agreement, if implemented  Description: INTERVENTIONS:1. Assess patient/family's coping skills and  non-compliant behavior (including use of illegal substances)2. Notify security of behavior or suspected illegal substances which indicate the need for search of the family and/or belongings3. Encourage verbalization of thoughts and concerns in a socially appropriate manner4. Utilize positive, consistent limit setting strategies supporting safety of patient, staff and others5. Encourage participation in the decision making process about the behavioral management agreement6. If a visitor's behavior poses a threat to safety call refer to organization policy.7. Initiate consult with , Psychosocial CNS, Spiritual Care as appropriate  Progressing    Problem: Discharge Planning  Goal: Discharge to home or other facility with appropriate resources  4/30/2025 0859 by Thu Gutierrez RN  Outcome: Progressing  Pt involved in discharge planning. Barriers to discharge discussed with patient. Discharge learning needs identified. Discuss with patient any additional needed resources and transportation plans. Case management following plan of care.    Problem: Pain  Goal: Verbalizes/displays adequate comfort level or baseline comfort level  4/30/2025 0859 by Thu Gutierrez RN  Outcome: Progressing    Problem: Neurosensory - Adult  Goal: Achieves stable or improved neurological status  4/30/2025 0859 by Thu Gutierrez RN  Outcome: Progressing    Problem: Musculoskeletal - Adult  Goal: Return mobility to safest level of function  4/30/2025 0859 by Thu Gutierrez RN  Outcome: Progressing

## 2025-04-30 NOTE — PROGRESS NOTES
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 assist, HOB 30 deg     Additional Pertinent Hx: 59 y.o. male to ER 4/24 with c/o R sided weakness; transfer to [facility]; imaging showed acute parenchymal hemorrhage involving the left basal ganglia measuring 2 x 1.2 cm with no mass effect or midline shift.  There is a critical stenosis of more than 90% in the left proximal cervical ICA.  60% stenosis in the right proximal cervical ICA.   PMHx: traumatic brain injury, traumatic intraventricular hemorrhage in 2019, HTN, DM, HLD, L AKA, tobacco abuse, depression, chronic pain syndrome, history of DVT, history of HIT        Diagnosis: Intracranial hemorrhage  Treatment Diagnosis: decreased functional strength and RUE functional strength    Subjective:  Pt met supine in bed and agreeable to OT session.     Pain: No pain reported.     Social/Functional History  Lives With: Alone  Type of Home:  (hotel for the past 4-5 months)  Home Equipment: Wheelchair - Electric, Wheelchair - Manual  Prior Level of Assist for ADLs: Independent (sponge bathing)  Prior Level of Assist for Ambulation: Independent in home with wheelchair and able to pivot transfer (primarily uses electric w/c)  Prior Level of Assist for Transfers: Independent (pivot transfer bed to w/c)  Additional Comments: PLOF limited 2* increased agitation with questions. Pt takes w/c to [employer] to obtain groceries.  Prior Function  Prior Level of Assist for ADLs: Independent (sponge bathing)  Prior Level of Assist for Transfers: Independent (pivot transfer bed to w/c)  Additional Comments: PLOF limited 2* increased agitation with questions. Pt takes w/c to [employer] to obtain groceries.    Objective:    Cognition/Orientation:  WFL    Bed mobility   Supine to sit: SBA with effortful movement. Cues for safety and 
Occupational Therapy/Physical Therapy Attempt  Pending neurosurgery consult at this time. Will continue to follow and evaluate for therapy once medically appropriate and schedule permits.     Monica Connor OTR/L  Angie Pastor, PT, DPT  [id number]   
Patient is A&Ox4 with intermittent confusion but easily reoriented. VSS this shift. NIH remains a 5 this shift; see flowsheets. Patient has endorsed pain to r shoulder managed per MAR and non-pharm measures. Patient is tolerating dysphagia diet. Tolerating Q2 turns.  Voiding via urinal.Patient updated on plan of care. Fall and safety precautions in place, call light within reach. Plan of care ongoing.   
Patient is alert and oriented x4 ( with intermittent confusion). VSS on room air. Medications given per MAR, no side effects noted. No complaints of pain, continuing to monitor and manage per MAR. Voiding well via check and changed, x1 bm this shift.      Patient is currently resting in bed with bed alarm on for safety. Call light within reach and all fall precautions in place. Plan of care continues.  
Patient is alert and oriented x4( with intermittent confused). VSS on room air. Medications given per MAR, no side effects noted. Patient ambulating x1 with gait belt and walker. No complaints of pain, continuing to monitor and manage per MAR. Voiding well via BRP, x1 bm this shift.      Patient is currently resting in bed with bed alarm on for safety. Call light within reach and all fall precautions in place. Plan of care continues.  
Patient is alert and oriented x4, with need to reorient intermittently.  VSS on room air. Medications given per MAR, no side effects noted. Patient has not ambulated this shift. Endorses pain to foot. Continuing to monitor and manage per MAR. Voiding well via urinal.     Pt is very agitated and verbally aggressive with RN. De-escalation techniques utilized. RN advised pt and his family he was placed as NPO due to coughing and not being able to swallow safely. When this RN took over care, MD advised we can order a dysphagia pureed, moderately thick diet. RN educated pt on swallowing safely and aspiration precautions, and hence the need for modified diet. Family member verbalized understanding, but pt continued to be agitated and upset.      Patient is currently resting in bed with bed alarm on for safety. Call light within reach and all fall precautions in place. Plan of care continues.  
Patient is alert and oriented x4. VSS on room air. Medications given per MAR, no side effects noted. Patient ambulating x1 with gait belt and walker. No complaints of pain, continuing to monitor and manage per MAR. Voiding well via BRP, x1 bm this shift.     Discharge paperwork reviewed. All questions answered. PT discharged to SNF via transport.   
Patient is alert and oriented x4. VSS on room air. Medications given per MAR, no side effects noted. Patient ambulating x1-2 s/p, tolerated well. Worked with PT/OT this shift and ambulated to chair. No complaints of pain, continuing to monitor and manage per MAR. Voiding well via urinal, x3 bm this shift.      Patient is currently resting in bed with bed alarm on for safety. Call light within reach and all fall precautions in place. Plan of care continues.    Electronically signed by Vale Lara RN on 4/29/2025 at 6:12 PM   
Physical Therapy  Daily Treatment Note    Discharge Recommendation:  Skilled Nursing Facility  Equipment Needs:  Defer to next level of care    Assessment:  Pt with improved activity tolerance today. Needing up to 2 person assist for safe transfers at this time. Pt demonstrating impulsiveness and decreased safety awareness at times, but receptive to cueing. Good participation overall. Pt seems motivated for recovery. Currently limited by decreased strength, decreased balance. Would benefit from continued IP PT at D/C to maximize strength, function and independence. Plan is for SNF.     Chart Reviewed: Yes     Other Position/Activity Restrictions: up with assist, HOB 30 deg   Additional Pertinent Hx: 59 y.o. male to ER 4/24 with c/o R sided weakness; transfer to [facility]; imaging showed acute parenchymal hemorrhage involving the left basal ganglia measuring 2 x 1.2 cm with no mass effect or midline shift.  There is a critical stenosis of more than 90% in the left proximal cervical ICA.  60% stenosis in the right proximal cervical ICA.   PMHx: traumatic brain injury, traumatic intraventricular hemorrhage in 2019, HTN, DM, HLD, L AKA, tobacco abuse, depression, chronic pain syndrome, history of DVT, history of HIT      Diagnosis: ICH   Treatment Diagnosis: impaired functional transfers    Subjective: Pt in bed initially. Agreeable to working with therapy.  \"I'm getting stronger.\" Stated he feels his R sided weakness is improving.   \"The hits just keep coming.\" Pt tearful at times when discussing multiple health issued over the past few years.   \"I don't need help. You don't have to hold onto me.\"    Pain: No c/o voiced    Objective:    Bed mobility  Supine to sit: SBA, HOB up partially with use of railing. Pt using momentum to assist.   Scooting: SBA to EOB    Transfers  Sit to stand: Min assist x 1 from bed  Stand to sit: Min assist x 1 onto bed  Bed > chair: Dependent (Min assist x 2) via stand pivot (to the R 
Pt made NPO again after pt had a violent coughing episode with honey thick liquids. Pt educated on the risk of aspiration PNE.   
Pt. is A&O x4. Pt. has slurred speech, but according to pt., it has improved. Pt.'s diet has been increased from pureed to soft & bite sized. Pt. has adequate intake of both food & liquids. Pt. received insulin multiple times this shift. Pt. received 2 baths today, which he was very happy with. Pt. stated that he is happy with the care provided this shift. Pt.'s bed alarm is on, with call light within reach & table at bedside. All standard fall & safety precautions enabled. Pt. still experiencing pain in the right shoulder, described as \"nerve pain.\" Pain is being managed per MAR, with no side effects noted.      Electronically signed by Elsa Amor RN on 4/28/2025 at 6:55 PM    
RN at bedside. Pt awake and agitated - states he has not eaten in two days and that everyone is \"ignoring him\". RN has done assessment, obtained VS, provided christopher care, and administered prn pain medication as requested.  Upon reviewing chart, pt received MBS today with SLP. Per SLP's note, diet recommendation is minced/moist texture with honey thickened liquids. Pt still has NPO ordered. Provider messaged to ask if SLP recc diet may be placed with carb restriction d/t history of DM, provider approved 5 carb choices.  RN attempted to review POC with pt and provide support for his agitation but he declined.  
RN attempted to complete the patients admission paperwork to the best of my ability, but patient was agitated. He was only answering \"yes\" or \"no\" and did not want to participate. The paperwork is complete but we are unsure of the accuracy because of the patients attitude towards the questions. Primary RN aware.  
Report called to receiving 5T RN. Resource nurse here to transport pt to 55. Dietary informed, dinner order placed.  
VSS on RA, afebrile. Alert and oriented x4. No complaints of pain this shift but pt c/o itching due to psorosis, treated via MAR. No acute events overnight. All fall & safety precautions in place. Call light within reach. Continue current plan of care.    
VSS on RA, afebrile. Alert and oriented x4. Pt reported no pain this shift.  No acute events overnight. Pt voiding via urinal, no BM this shift All fall & safety precautions in place. Call light within reach. Continue current plan of care.    
Time: 04/28/25  5:09 AM   Result Value Ref Range    Phosphorus 3.2 2.5 - 4.9 mg/dL       Gen: NAD   Pulm: Easy WOB, symmetric chest rise   CV: RRR   Abd: S/NT/ND   Neuro:     GCS: E4 V5 M6 (15)              A&O x4, speech clear and appropriate              Visual Fields full, EOMI              Facial Symmetry normal  Motor 5/5 left ext, no drift; 3/5 RUE, 5/5 RLE              Sensation intact               Patellar reflex 2+ bilaterally, negative babinski, negative gaffney's    Recent Imaging:   XR CHEST (2 VW)   Final Result   Right basilar atelectasis.      Electronically signed by Marco Doty      FL MODIFIED BARIUM SWALLOW W VIDEO   Final Result      MRI BRAIN W WO CONTRAST   Final Result      1. Evolving recent left basal ganglia hemorrhage with mild adjacent vasogenic edema. No suspicious enhancement.   2. Remote lacunar infarct mid beatrice.   3. Moderate patchy and confluent T2 hyperintense white matter disease likely related to chronic microvascular ischemia.   4. Mild to moderate parenchymal volume loss.          Electronically signed by Kevan Deleon MD      CT HEAD WO CONTRAST   Final Result   1. Stable intraparenchymal hemorrhage in the left basal ganglia without significant progression or significant mass effect.      Electronically signed by Stepan Traylor MD           A/P: Alhaji Elmore is a 59 y.o. male who is admitted for hemorrhagic stroke L basal ganglia  -likely related to embolus from severe LICA stenosis     -Frequent neurochecks   -Pain control   -may start antiplatelets tomorrow AM if CT stable  -Regular diet, bowel regimen   -PT/OT, Activity as tolerated   -DVT ppx with subq heparin, SCDs     Dispo: floor, consider inpt rehab     
determine least restrictive diet    Instrumental assessment results  4/25/25  Oral Phase  Pt with mildly prolonged mastication d/t edentulous state.  Pt exhibiting premature spillage into vallecula and pyriforms  Pharyngeal Phase  Pt exhibiting mod pharyngeal impairments.  Impairments characterized by premature bolus loss to vallecula and pyriforms, decreased laryngeal elevation and decreased laryngeal vestibule closure.  This resulted in significant silent aspiration of thin and nectar thick liquids, via cup and straw, small and large volume.  Pt produced cued cough but was not effective in clearing aspiration.  Chin tuck, 3 second hold and head turns to right and left were attempted to eliminate aspiration, but were not effective.  Pt was noted to begin coughing after study completed.  No aspiration of puree and honey thick liquids was identified.   Upper Esophageal Screen  Unremarkable, no obvious backflow noted    Speech, Language, Cognitive Evaluation 4/25/25  Pt exhibiting mod dysarthria.  Comprehension and language appear intact. Pt answered yes/no questions and followed complex commands accurately. Speech is fluent with no paraphasia/word finding noted.  Pt read sentence level material accurately.  Not able to assess writing d/t weakness of dominant R UE.  Cognition appears grossly intact. Pt oriented, able to state impairments affecting him at this time. Pt provided solutions to basic problems and solved money/time concept problems accurately.  Will cont to assess as appropriate    Treatment:  Dysphagia Goals:  The pt will be seen to address the following goals  Goal 1: Patient/caregiver will demonstrate understanding of dysphagia recommendations/concerns  4/25; pt educated to results of MBS. Replayed portion of video and explained anatomy/physiology related to swallowing and rationale for diet recommendations/strategies to improve safety of swallow. Pt stated comprehension but will benefit from cont 
help is needed moving to and from a bed to a chair?: Total  How much help is needed standing up from a chair using your arms?: Total  How much help is needed walking in hospital room?: Total  How much help is needed climbing 3-5 steps with a railing?: Total  AM-PAC Inpatient Mobility Raw Score : 10  AM-PAC Inpatient T-Scale Score : 32.29  Mobility Inpatient CMS 0-100% Score: 76.75  Mobility Inpatient CMS G-Code Modifier : CL         [place] Score       Goals  Short Term Goals  Time Frame for Short Term Goals: discharge  Short Term Goal 1: Pt will transfer supine <--> sit with supervision  Short Term Goal 2: Pt will perform stand pivot transfer with min A x 1  Patient Goals   Patient Goals : return home       Education  Patient Education  Education Given To: Patient  Education Provided: Role of Therapy  Education Method: Demonstration  Barriers to Learning: Cognition  Education Outcome: Continued education needed      Therapy Time   Individual Concurrent Group Co-treatment   Time In 1012         Time Out 1050         Minutes 38               Timed Code Treatment Minutes:  23    Total Treatment Minutes:  38    If patient is discharged prior to next treatment, this note will serve as the discharge summary.  Angie Pastor, PT, DPT  [id number]     
education recommended. Cont.     Goal 2: Patient will participate in instrumental assessment of swallow function as appropriate.  4/25; goal met     New goal:  Goal 3:  Pt will consume PO safely without signs or symptoms of aspiration  4/27 new order received d/t concerns with coughing while taking PO.   Pt very upset stating he has not gotten anything to eat. Explained concerns per RN with coughing while eating.  Pt denied any issues.  Pt re-assessed with puree, honey thick liquids and minced/moist texture. No overt signs of aspiration emerged across all trials. Pt demonstrated adequate labial seal with no anterior loss of bolus. Pt edentulous, however managed minced/moist texture adequately and cleared oral cavity.  Recommend resume minced/moist- honey thick liquids  Cont goal  4/28: Pt seated upright in bed, agreeable to be seen with afternoon meal. Per order pt on puree solids/moderately thick liquids, despite SLP rec's yesterday for minced and moist solids. SLP reviewed swallow strategies written on on white board and pt implemented well given initial min, fading to MI. Pt fed self all trials. SLP cued pt to complete effortful swallows >30 across meal. Pt appeared to swallow all PO. No overt s/s associated with aspiration. WBC is currently WNL. Discussed importance of carry over of strategies when SLP not present. Pt expressed understanding. Recommend minced and moist solids/moderately thick liquids. SLP perfectserved MD to get OK to re-advance solids. Cont.   4/29: Per chart review, WBC is WNL and pt remains on room air. Consumed moderately thick liquids and puree with no overt clinical s/s associated with aspiration. Cont goal   4/30: Pt seated upright in bed consuming AM meal. Pt fed self all trials of PO. Pt demonstrated timely and complete mastication. Trace residue remaining along lingual surface, clears with cued liquid wash. SLP cued pt to complete >35 effortful swallows with all PO. Pt appeared to 
this non-dedicated study.     1. Acute parenchymal hemorrhage involving the left basal ganglia measuring 2.0 x 1.2 cm. No significant mass effect or midline shift. 2. Critical stenosis of the left proximal cervical ICA due to extensive calcified plaque (greater than 90%). 3. Fibrocalcific plaque of the right proximal cervical ICA contributing to 60% stenosis. 4. Moderate narrowing of the bilateral paraclinoid ICAs. 5. 3 cm nodule within the right thyroid lobe.  Nonemergent thyroid ultrasound is recommended for further evaluation. Findings were discussed with PAPA HERNANDEZ at 1:33 pm on 4/24/2025.     CTA Head Neck W/Contrast  Result Date: 4/24/2025  EXAMINATION: CT OF THE HEAD WITHOUT CONTRAST; CTA OF THE HEAD AND NECK WITH CONTRAST 4/24/2025 12:47 pm; 4/24/2025 12:49 pm TECHNIQUE: CT of the head was performed without the administration of intravenous contrast. Automated exposure control, iterative reconstruction, and/or weight based adjustment of the mA/kV was utilized to reduce the radiation dose to as low as reasonably achievable.; CTA of the head and neck was performed with the administration of intravenous contrast. Multiplanar reformatted images are provided for review.  MIP images are provided for review. Stenosis of the internal carotid arteries measured using NASCET criteria. Automated exposure control, iterative reconstruction, and/or weight based adjustment of the mA/kV was utilized to reduce the radiation dose to as low as reasonably achievable. Noncontrast CT of the head with reconstructed 2-D images are also provided for review.  This scan was analyzed using Viz[employer]ai contact LVO. Identification of suspected findings is not for diagnostic use beyond notification. Viz LVO is limited to analysis of imaging data and should not be used in-lieu of full patient evaluation or relied upon to make or confirm diagnosis. COMPARISON: None HISTORY: ORDERING SYSTEM PROVIDED HISTORY: Right-sided weakness, dysarthria 
Right-sided weakness, dysarthria FINDINGS: CT HEAD: BRAIN/VENTRICLES:  Acute parenchymal hemorrhage involving the left basal ganglia measuring 2.0 x 1.2 cm.  No extraaxial fluid collection. Grey-white differentiation is otherwise maintained.  No evidence of significant mass effect or midline shift.  No evidence of hydrocephalus.  Mild generalized parenchymal volume loss and moderate to severe chronic small vessel ischemic changes. ORBITS: The visualized portion of the orbits demonstrate no acute abnormality. SINUSES:  The visualized paranasal sinuses and mastoid air cells demonstrate no acute abnormality. SOFT TISSUES/SKULL: No acute abnormality of the visualized skull or soft tissues. CTA NECK: AORTIC ARCH/ARCH VESSELS: No dissection or arterial injury.  No significant stenosis of the brachiocephalic or subclavian arteries. CAROTID ARTERIES: Fibrocalcific plaque of the right proximal cervical ICA contributing to 60% stenosis.  Critical stenosis of the left proximal cervical ICA due to extensive calcified plaque (greater than 90%). VERTEBRAL ARTERIES: No dissection, arterial injury, or significant stenosis. SOFT TISSUES: The lung apices are clear.  No cervical or superior mediastinal lymphadenopathy.  The larynx and pharynx are unremarkable.  No acute abnormality of the salivary and thyroid glands.  3 cm nodule within the right thyroid lobe. BONES: No acute osseous abnormality. CTA HEAD: ANTERIOR CIRCULATION: Scattered vascular calcifications throughout the carotid siphons.  Moderate narrowing the bilateral paraclinoid ICAs.  No significant stenosis of the anterior cerebral, or middle cerebral arteries. No aneurysm. POSTERIOR CIRCULATION: No significant stenosis of the basilar or posterior cerebral arteries. No aneurysm. OTHER: No dural venous sinus thrombosis on this non-dedicated study.     1. Acute parenchymal hemorrhage involving the left basal ganglia measuring 2.0 x 1.2 cm. No significant mass effect or 
parenchymal hemorrhage involving the left basal ganglia measuring 2.0 x 1.2 cm.  No extraaxial fluid collection. Grey-white differentiation is otherwise maintained.  No evidence of significant mass effect or midline shift.  No evidence of hydrocephalus.  Mild generalized parenchymal volume loss and moderate to severe chronic small vessel ischemic changes. ORBITS: The visualized portion of the orbits demonstrate no acute abnormality. SINUSES:  The visualized paranasal sinuses and mastoid air cells demonstrate no acute abnormality. SOFT TISSUES/SKULL: No acute abnormality of the visualized skull or soft tissues. CTA NECK: AORTIC ARCH/ARCH VESSELS: No dissection or arterial injury.  No significant stenosis of the brachiocephalic or subclavian arteries. CAROTID ARTERIES: Fibrocalcific plaque of the right proximal cervical ICA contributing to 60% stenosis.  Critical stenosis of the left proximal cervical ICA due to extensive calcified plaque (greater than 90%). VERTEBRAL ARTERIES: No dissection, arterial injury, or significant stenosis. SOFT TISSUES: The lung apices are clear.  No cervical or superior mediastinal lymphadenopathy.  The larynx and pharynx are unremarkable.  No acute abnormality of the salivary and thyroid glands.  3 cm nodule within the right thyroid lobe. BONES: No acute osseous abnormality. CTA HEAD: ANTERIOR CIRCULATION: Scattered vascular calcifications throughout the carotid siphons.  Moderate narrowing the bilateral paraclinoid ICAs.  No significant stenosis of the anterior cerebral, or middle cerebral arteries. No aneurysm. POSTERIOR CIRCULATION: No significant stenosis of the basilar or posterior cerebral arteries. No aneurysm. OTHER: No dural venous sinus thrombosis on this non-dedicated study.     1. Acute parenchymal hemorrhage involving the left basal ganglia measuring 2.0 x 1.2 cm. No significant mass effect or midline shift. 2. Critical stenosis of the left proximal cervical ICA due to

## 2025-04-30 NOTE — PLAN OF CARE
NEUROSURGERY PLAN OF CARE NOTE    LES MOULTON  [id number]   1965 4/30/2025    Radiological Findings:  CT HEAD WO CONTRAST  Result Date: 4/29/2025  Stable evolving intraparenchymal hematoma in left basal ganglia measuring approximately 2.1 x 1.3 cm. Mild adjacent vasogenic edema. No significant mass effect. No large territorial infarction. Mild parenchymal volume loss. Moderate white matter hypoattenuation likely related to chronic microvascular ischemia. No extra-axial fluid collection or midline shift.     Assessment:  Patient is a 59 y.o. y/o male with RUE weakness, R facial droop, and dysarthria. CT head showed a L basal ganglia ICH, stable on repeat imaging, likely related to embolus from severe LICA stenosis.    Recommendations:  Q4H neurochecks   Pain control  ASA 81 mg Daily  Regular diet, bowel regimen   Advance Diet/Activity as tolerated   DVT ppx with subq heparin, SCDs  Follow up with Dr. Kwong in 2 weeks    DISPO: OK to DC when placement finalized     Electronically signed by: DAGMAR De La Paz CNP, APRN-CNP, 4/30/2025 8:15 AM  [phone]

## 2025-04-30 NOTE — DISCHARGE INSTR - COC
Continuity of Care Form    Patient Name: Alhaji Elmore   :  1965  MRN:  [id number]    Admit date:  2025  Discharge date:  2025    Code Status Order: Limited   Advance Directives:     Admitting Physician:  Yennifer Castro MD  PCP: KAMINI Alexandre MD    Discharging Nurse: Thu   Discharging Hospital Unit/Room#: 5512/5512-01  Discharging Unit Phone Number: [phone]    Emergency Contact:   Extended Emergency Contact Information  Primary Emergency Contact: Hilda Bryant  Home Phone: [phone]  Relation: Other    Past Surgical History:  Past Surgical History:   Procedure Laterality Date    JOINT REPLACEMENT Bilateral     Hips       Immunization History:   Immunization History   Administered Date(s) Administered    COVID-19, MODERNA BLUE border, Primary or Immunocompromised, (age 12y+), IM, 100 mcg/0.5mL 2021, 2021, 2022, 2022    Influenza Virus Vaccine 10/10/2019    TDaP, ADACEL (age 10y-64y), BOOSTRIX (age 10y+), IM, 0.5mL 2019       Active Problems:  Patient Active Problem List   Diagnosis Code    Type 2 diabetes mellitus without complication, without long-term current use of insulin ([facility]) E11.9    Psoriasis L40.9    Arthritis with psoriasis ([facility]) L40.50    PVD (peripheral vascular disease) I73.9    Essential hypertension I10    Diabetic polyneuropathy ([facility]) E11.42    Cigarette nicotine dependence with nicotine-induced disorder F17.219    Open fracture of radius and ulna S52.90XB, S52.209B    Fracture of second metatarsal bone of left foot S92.322A    Closed fracture of left forearm S52.92XA    Multiple closed fractures of ribs of left side S22.42XA    Closed fracture of bone of left foot S92.902A    Depression F32.A    Chronic pain syndrome G89.4    Myofascial pain M79.18    Insomnia G47.00    Intracranial hemorrhage (HCC) I62.9    Intracerebral hemorrhage, nontraumatic (HCC) I61.9    Cocaine abuse ([facility]) F14.10       Isolation/Infection:   Isolation

## 2025-04-30 NOTE — DISCHARGE SUMMARY
large territorial infarction. Mild parenchymal volume loss. Moderate white matter hypoattenuation likely related to chronic microvascular ischemia. No extra-axial fluid collection or midline shift.     Impression: Stable evolving intraparenchymal hemorrhage left basal ganglia. Electronically signed by Kevan Deleon MD    XR CHEST (2 VW)  Result Date: 4/26/2025  XR CHEST (2 VW) Indication: severe cough COMPARISON: April 25, 2025 Findings: AP and lateral views of the chest were obtained. The heart is normal in size and configuration. There is right basilar atelectasis. The lungs are otherwise clear. No pneumothorax or effusion. Contrast is visualized within the colon from prior fluoroscopic study.     Right basilar atelectasis. Electronically signed by Marco FIGUEROA MODIFIED BARIUM SWALLOW W VIDEO  Result Date: 4/25/2025  Radiology exam is complete. No Radiologist dictation. Please follow up with ordering provider.     MRI BRAIN W WO CONTRAST  Result Date: 4/25/2025  EXAM: MRI BRAIN W WO CONTRAST INDICATION: Evaluate basal ganglia hematoma COMPARISON: CT head 04/24/2025 TECHNIQUE: Multiplanar, multisequence MR imaging of the head obtained without and with contrast. IV contrast: 12 mL IV ProHance. FINDINGS: Evolving recent left basal ganglia hemorrhage measuring approximately 2.3 x 1.0 cm. Mild adjacent vasogenic edema. No suspicious enhancement.. No acute infarct. Mild to moderate parenchymal volume loss. Moderate patchy and confluent T2 hyperintense white matter disease. Remote lacunar infarct mid beatrice. Major vascular flow voids are present. Midline structures demonstrate normal morphology. No suspicious marrow signal abnormity. Bilateral orbits are normal. Visualized paranasal sinuses and mastoid air cells are clear.     1. Evolving recent left basal ganglia hemorrhage with mild adjacent vasogenic edema. No suspicious enhancement. 2. Remote lacunar infarct mid beatrice. 3. Moderate patchy and confluent T2

## 2025-04-30 NOTE — CARE COORDINATION
Case Management Assessment            Discharge Note                    Date / Time of Note: 4/30/2025 9:19 AM                  Discharge Note Completed by: Jackeline Mclean RN    Patient Name: Alhaji Elmore   YOB: 1965  Diagnosis: Intracerebral hemorrhage (HCC) [I61.9]  Intracranial hemorrhage (HCC) [I62.9]  Intracerebral hemorrhage, nontraumatic (HCC) [I61.9]   Date / Time: 4/24/2025  5:54 PM    Current PCP: KAMINI Alexandre MD  Clinic patient: No    Hospitalization in the last 30 days: No       Advance Directives:  Code Status: Limited  [place] DNR form completed and on chart: Not Indicated    Financial:  Payor: [facility] MEDICARE / Plan: [employer] DUAL COMPLETE / Product Type: *No Product type* /      Pharmacy:    [facility] [address] [place] [address] - [address] [place] [address] [phone] -  [phone]  [address] [place] [address]  Phone: [phone] Fax: [phone]      Assistance purchasing medications?:    Assistance provided by Case Management: None at this time    Does patient want to participate in local refill/ meds to beds program?: Yes    Meds To Beds General Rules:  1. Can ONLY be done Monday- Friday between 8:30am-5pm  2. Prescription(s) must be in pharmacy by 3pm to be filled same day  3.Copy of patient's insurance/ prescription drug card and patient face sheet must be sent along with the prescription(s)  4. Cost of Rx cannot be added to hospital bill. If financial assistance is needed, please contact unit  or ;  or  CANNOT provide pharmacy voucher for patients co-pays  5. Patients can then  the prescription on their way out of the hospital at discharge, or pharmacy can deliver to the bedside if staff is available. (payment due at time of pick-up or delivery - cash, check, or card accepted)     Able to afford home medications/ co-pay costs: Yes    ADLS:  Current PT AM-PAC Score: 13 /24  Current OT AM-PAC Score: 15 
CM sent multiple referrals for SNF, pending at this time.  Will need a pre-cert once accepted.  Patient is a hard placement due to positive drug screen and is homeless.    CM spoke with patient at bedside.  He is agreeable to SNF at [facility].  JOSS spoke with Tamiko at facility who is starting pre-cert today.    Jackeline Mclean RN, BSN,    Ortho/Neuro   [phone]    
CM spoke with patient at bedside.  He is agreeable to SNF referrals, preference to [facility].  CM faxed referrals, will need pre-cert once accepted.    Jackeline Mclean RN, BSN,    Ortho/Neuro   [phone]    
PATH-7 Caregiver Assessment Tool        Case Management recommendations from information gathered from PATH-7 Caregiver Assessment include:     SNF placement at discharge as pt has no family able to assist.         Leonela Li RN, BSN, CM  Case Management Department  [phone]     
[phone] Fax: [phone]

## 2025-04-30 NOTE — PLAN OF CARE
Problem: Discharge Planning  Goal: Discharge to home or other facility with appropriate resources  4/29/2025 2107 by Felisha Tafoya RN  Outcome: Progressing     Problem: Pain  Goal: Verbalizes/displays adequate comfort level or baseline comfort level  4/29/2025 2107 by Felisha Tafoya RN  Outcome: Progressing   Pt pain managed via MAR.     Problem: Neurosensory - Adult  Goal: Achieves stable or improved neurological status  4/29/2025 2107 by Felisha Tafoya RN  Outcome: Progressing     Problem: Safety - Adult  Goal: Free from fall injury  4/29/2025 2107 by Felisha Tafoya RN  Outcome: Progressing   Note: Pt educated on safety measures. Standard safety measures in place, bed in lowest position, bed locked and alarm on,  socks applied, call light and table in reach.

## 2025-06-13 NOTE — TELEPHONE ENCOUNTER
[id number] Jesse Penny with [facility] No   Release in media   Pt only  Seen twice in that time frame     [employer]
Jessie Pelaez with [employer] is requesting the patient's medical records from 9/11/2019-2/10/2020
Yes